# Patient Record
Sex: FEMALE | Race: BLACK OR AFRICAN AMERICAN | NOT HISPANIC OR LATINO
[De-identification: names, ages, dates, MRNs, and addresses within clinical notes are randomized per-mention and may not be internally consistent; named-entity substitution may affect disease eponyms.]

---

## 2017-09-22 ENCOUNTER — CHARTING TRANS (OUTPATIENT)
Dept: OTHER | Age: 47
End: 2017-09-22

## 2017-09-22 ENCOUNTER — MYAURORA ACCOUNT LINK (OUTPATIENT)
Dept: OTHER | Age: 47
End: 2017-09-22

## 2017-09-22 ASSESSMENT — PAIN SCALES - GENERAL: PAINLEVEL_OUTOF10: 0

## 2017-10-26 ENCOUNTER — LAB SERVICES (OUTPATIENT)
Dept: OTHER | Age: 47
End: 2017-10-26

## 2017-10-26 ENCOUNTER — MYAURORA ACCOUNT LINK (OUTPATIENT)
Dept: OTHER | Age: 47
End: 2017-10-26

## 2017-10-26 ENCOUNTER — CHARTING TRANS (OUTPATIENT)
Dept: OTHER | Age: 47
End: 2017-10-26

## 2017-10-26 ASSESSMENT — PAIN SCALES - GENERAL: PAINLEVEL_OUTOF10: 0

## 2017-11-02 ENCOUNTER — CHARTING TRANS (OUTPATIENT)
Dept: OTHER | Age: 47
End: 2017-11-02

## 2017-11-02 LAB — PAP WITH HIGH RISK HPV: NORMAL

## 2018-07-06 ENCOUNTER — MYAURORA ACCOUNT LINK (OUTPATIENT)
Dept: OTHER | Age: 48
End: 2018-07-06

## 2018-07-06 ENCOUNTER — CHARTING TRANS (OUTPATIENT)
Dept: OTHER | Age: 48
End: 2018-07-06

## 2018-07-06 ASSESSMENT — PAIN SCALES - GENERAL: PAINLEVEL_OUTOF10: 0

## 2018-10-31 VITALS
TEMPERATURE: 98.1 F | OXYGEN SATURATION: 98 % | SYSTOLIC BLOOD PRESSURE: 134 MMHG | WEIGHT: 234.35 LBS | HEIGHT: 69 IN | DIASTOLIC BLOOD PRESSURE: 88 MMHG | BODY MASS INDEX: 34.71 KG/M2 | HEART RATE: 79 BPM

## 2018-11-02 VITALS
HEART RATE: 77 BPM | RESPIRATION RATE: 18 BRPM | HEIGHT: 69 IN | BODY MASS INDEX: 34.07 KG/M2 | DIASTOLIC BLOOD PRESSURE: 78 MMHG | SYSTOLIC BLOOD PRESSURE: 130 MMHG | WEIGHT: 230 LBS

## 2018-11-03 VITALS
SYSTOLIC BLOOD PRESSURE: 130 MMHG | WEIGHT: 226 LBS | HEART RATE: 78 BPM | OXYGEN SATURATION: 97 % | DIASTOLIC BLOOD PRESSURE: 80 MMHG | HEIGHT: 69 IN | TEMPERATURE: 97.8 F | RESPIRATION RATE: 20 BRPM | BODY MASS INDEX: 33.47 KG/M2

## 2019-02-23 RX ORDER — FLUTICASONE PROPIONATE 50 MCG
SPRAY, SUSPENSION (ML) NASAL
Qty: 16 ML | Refills: 1 | Status: SHIPPED | OUTPATIENT
Start: 2019-02-23 | End: 2019-03-21 | Stop reason: SDUPTHER

## 2019-02-26 ENCOUNTER — TELEPHONE (OUTPATIENT)
Dept: SCHEDULING | Age: 49
End: 2019-02-26

## 2019-02-26 ENCOUNTER — E-ADVICE (OUTPATIENT)
Dept: OBGYN | Age: 49
End: 2019-02-26

## 2019-02-26 DIAGNOSIS — I10 HTN (HYPERTENSION), BENIGN: Primary | ICD-10-CM

## 2019-02-26 RX ORDER — AMLODIPINE BESYLATE 5 MG/1
5 TABLET ORAL DAILY
Qty: 30 TABLET | Refills: 0 | Status: SHIPPED | OUTPATIENT
Start: 2019-02-26 | End: 2019-04-25 | Stop reason: SDUPTHER

## 2019-02-26 RX ORDER — AMLODIPINE BESYLATE 5 MG/1
TABLET ORAL
COMMUNITY
Start: 2018-07-06 | End: 2019-02-26 | Stop reason: SDUPTHER

## 2019-03-04 ENCOUNTER — TELEPHONE (OUTPATIENT)
Dept: SCHEDULING | Age: 49
End: 2019-03-04

## 2019-03-21 DIAGNOSIS — Z00.00 PREVENTATIVE HEALTH CARE: Primary | ICD-10-CM

## 2019-03-21 RX ORDER — FLUTICASONE PROPIONATE 50 MCG
2 SPRAY, SUSPENSION (ML) NASAL DAILY
Qty: 16 ML | Refills: 1 | Status: SHIPPED | OUTPATIENT
Start: 2019-03-21

## 2019-04-19 ENCOUNTER — OFFICE VISIT (OUTPATIENT)
Dept: INTERNAL MEDICINE | Age: 49
End: 2019-04-19

## 2019-04-19 ENCOUNTER — LAB SERVICES (OUTPATIENT)
Dept: LAB | Age: 49
End: 2019-04-19

## 2019-04-19 DIAGNOSIS — I10 ESSENTIAL HYPERTENSION: Primary | ICD-10-CM

## 2019-04-19 DIAGNOSIS — Z12.9 CANCER SCREENING: ICD-10-CM

## 2019-04-19 DIAGNOSIS — I10 ESSENTIAL HYPERTENSION: ICD-10-CM

## 2019-04-19 DIAGNOSIS — E78.00 PURE HYPERCHOLESTEROLEMIA: ICD-10-CM

## 2019-04-19 DIAGNOSIS — L81.9 SKIN HYPOPIGMENTATION: ICD-10-CM

## 2019-04-19 LAB
ANION GAP SERPL CALC-SCNC: 11 MMOL/L (ref 10–20)
BUN SERPL-MCNC: 11 MG/DL (ref 6–20)
BUN/CREAT SERPL: 16 (ref 7–25)
CALCIUM SERPL-MCNC: 9.5 MG/DL (ref 8.4–10.2)
CHLORIDE SERPL-SCNC: 105 MMOL/L (ref 98–107)
CHOLEST SERPL-MCNC: 271 MG/DL
CHOLEST/HDLC SERPL: 3.3 {RATIO}
CO2 SERPL-SCNC: 27 MMOL/L (ref 21–32)
CREAT SERPL-MCNC: 0.71 MG/DL (ref 0.51–0.95)
FASTING STATUS PATIENT QL REPORTED: NORMAL HRS
GLUCOSE SERPL-MCNC: 76 MG/DL (ref 65–99)
HDLC SERPL-MCNC: 82 MG/DL
LDLC SERPL-MCNC: 166 MG/DL
LENGTH OF FAST TIME PATIENT: ABNORMAL HRS
NONHDLC SERPL-MCNC: 189 MG/DL
POTASSIUM SERPL-SCNC: 4.2 MMOL/L (ref 3.4–5.1)
SODIUM SERPL-SCNC: 139 MMOL/L (ref 135–145)
TRIGL SERPL-MCNC: 114 MG/DL

## 2019-04-19 PROCEDURE — 36415 COLL VENOUS BLD VENIPUNCTURE: CPT

## 2019-04-19 PROCEDURE — 3079F DIAST BP 80-89 MM HG: CPT | Performed by: INTERNAL MEDICINE

## 2019-04-19 PROCEDURE — 80048 BASIC METABOLIC PNL TOTAL CA: CPT | Performed by: INTERNAL MEDICINE

## 2019-04-19 PROCEDURE — 3074F SYST BP LT 130 MM HG: CPT | Performed by: INTERNAL MEDICINE

## 2019-04-19 PROCEDURE — 99214 OFFICE O/P EST MOD 30 MIN: CPT | Performed by: INTERNAL MEDICINE

## 2019-04-19 PROCEDURE — 80061 LIPID PANEL: CPT | Performed by: INTERNAL MEDICINE

## 2019-04-19 SDOH — HEALTH STABILITY: MENTAL HEALTH
STRESS IS WHEN SOMEONE FEELS TENSE, NERVOUS, ANXIOUS, OR CAN'T SLEEP AT NIGHT BECAUSE THEIR MIND IS TROUBLED. HOW STRESSED ARE YOU?: NOT AT ALL

## 2019-04-19 SDOH — HEALTH STABILITY: PHYSICAL HEALTH: ON AVERAGE, HOW MANY MINUTES DO YOU ENGAGE IN EXERCISE AT THIS LEVEL?: 0 MIN

## 2019-04-19 SDOH — HEALTH STABILITY: PHYSICAL HEALTH: ON AVERAGE, HOW MANY DAYS PER WEEK DO YOU ENGAGE IN MODERATE TO STRENUOUS EXERCISE (LIKE A BRISK WALK)?: 0 DAYS

## 2019-04-19 ASSESSMENT — PATIENT HEALTH QUESTIONNAIRE - PHQ9
2. FEELING DOWN, DEPRESSED OR HOPELESS: NOT AT ALL
1. LITTLE INTEREST OR PLEASURE IN DOING THINGS: NOT AT ALL
1. LITTLE INTEREST OR PLEASURE IN DOING THINGS: NOT AT ALL
SUM OF ALL RESPONSES TO PHQ9 QUESTIONS 1 AND 2: 0
2. FEELING DOWN, DEPRESSED OR HOPELESS: NOT AT ALL

## 2019-04-19 ASSESSMENT — PAIN SCALES - GENERAL: PAINLEVEL: 0

## 2019-04-23 ENCOUNTER — HOSPITAL (OUTPATIENT)
Dept: OTHER | Age: 49
End: 2019-04-23

## 2019-04-23 ENCOUNTER — IMAGING SERVICES (OUTPATIENT)
Dept: MAMMOGRAPHY | Age: 49
End: 2019-04-23

## 2019-04-23 DIAGNOSIS — Z12.9 CANCER SCREENING: ICD-10-CM

## 2019-04-23 PROCEDURE — 77067 SCR MAMMO BI INCL CAD: CPT | Performed by: EMERGENCY MEDICINE

## 2019-04-23 PROCEDURE — 77063 BREAST TOMOSYNTHESIS BI: CPT | Performed by: EMERGENCY MEDICINE

## 2019-04-25 ENCOUNTER — E-ADVICE (OUTPATIENT)
Dept: INTERNAL MEDICINE | Age: 49
End: 2019-04-25

## 2019-04-25 DIAGNOSIS — I10 HTN (HYPERTENSION), BENIGN: ICD-10-CM

## 2019-04-25 RX ORDER — AMLODIPINE BESYLATE 10 MG/1
10 TABLET ORAL DAILY
Qty: 90 TABLET | Refills: 1 | Status: SHIPPED | OUTPATIENT
Start: 2019-04-25 | End: 2019-05-02 | Stop reason: SDUPTHER

## 2019-04-26 ENCOUNTER — TELEPHONE (OUTPATIENT)
Dept: SCHEDULING | Age: 49
End: 2019-04-26

## 2019-04-30 ENCOUNTER — E-ADVICE (OUTPATIENT)
Dept: INTERNAL MEDICINE | Age: 49
End: 2019-04-30

## 2019-05-02 DIAGNOSIS — I10 HTN (HYPERTENSION), BENIGN: ICD-10-CM

## 2019-05-02 RX ORDER — AMLODIPINE BESYLATE 5 MG/1
5 TABLET ORAL DAILY
Qty: 90 TABLET | Refills: 3 | Status: SHIPPED | OUTPATIENT
Start: 2019-05-02 | End: 2020-05-01

## 2019-05-13 ENCOUNTER — E-ADVICE (OUTPATIENT)
Dept: OBGYN | Age: 49
End: 2019-05-13

## 2019-05-17 ENCOUNTER — APPOINTMENT (OUTPATIENT)
Dept: INTERNAL MEDICINE | Age: 49
End: 2019-05-17

## 2020-01-17 ENCOUNTER — E-ADVICE (OUTPATIENT)
Dept: OBGYN | Age: 50
End: 2020-01-17

## 2020-01-17 ENCOUNTER — E-ADVICE (OUTPATIENT)
Dept: INTERNAL MEDICINE | Age: 50
End: 2020-01-17

## 2020-04-08 ENCOUNTER — E-ADVICE (OUTPATIENT)
Dept: INTERNAL MEDICINE | Age: 50
End: 2020-04-08

## 2020-05-27 ENCOUNTER — E-ADVICE (OUTPATIENT)
Dept: INTERNAL MEDICINE | Age: 50
End: 2020-05-27

## 2020-05-29 RX ORDER — AMLODIPINE BESYLATE 5 MG/1
TABLET ORAL
Qty: 30 TABLET | Refills: 11 | OUTPATIENT
Start: 2020-05-29

## 2020-06-02 ENCOUNTER — TELEPHONE (OUTPATIENT)
Dept: SCHEDULING | Age: 50
End: 2020-06-02

## 2020-06-03 ENCOUNTER — APPOINTMENT (OUTPATIENT)
Dept: INTERNAL MEDICINE | Age: 50
End: 2020-06-03

## 2020-06-03 ENCOUNTER — TELEPHONE (OUTPATIENT)
Dept: INTERNAL MEDICINE | Age: 50
End: 2020-06-03

## 2020-06-03 ENCOUNTER — V-VISIT (OUTPATIENT)
Dept: INTERNAL MEDICINE | Age: 50
End: 2020-06-03

## 2020-06-03 DIAGNOSIS — E78.00 PURE HYPERCHOLESTEROLEMIA: ICD-10-CM

## 2020-06-03 DIAGNOSIS — Z71.89 ADVICE GIVEN ABOUT 2019 NOVEL CORONAVIRUS BY TELEPHONE: ICD-10-CM

## 2020-06-03 DIAGNOSIS — I10 ESSENTIAL HYPERTENSION: Primary | ICD-10-CM

## 2020-06-03 PROCEDURE — 99213 OFFICE O/P EST LOW 20 MIN: CPT | Performed by: INTERNAL MEDICINE

## 2020-06-03 RX ORDER — AMLODIPINE BESYLATE 5 MG/1
5 TABLET ORAL DAILY
Qty: 90 TABLET | Refills: 0 | Status: SHIPPED | OUTPATIENT
Start: 2020-06-03 | End: 2020-08-27

## 2020-06-03 ASSESSMENT — PATIENT HEALTH QUESTIONNAIRE - PHQ9
SUM OF ALL RESPONSES TO PHQ9 QUESTIONS 1 AND 2: 0
CLINICAL INTERPRETATION OF PHQ2 SCORE: NO FURTHER SCREENING NEEDED
CLINICAL INTERPRETATION OF PHQ9 SCORE: NO FURTHER SCREENING NEEDED
2. FEELING DOWN, DEPRESSED OR HOPELESS: NOT AT ALL
SUM OF ALL RESPONSES TO PHQ9 QUESTIONS 1 AND 2: 0
1. LITTLE INTEREST OR PLEASURE IN DOING THINGS: NOT AT ALL

## 2020-06-03 ASSESSMENT — PAIN SCALES - GENERAL: PAINLEVEL: 0

## 2020-08-24 DIAGNOSIS — Z00.00 ROUTINE GENERAL MEDICAL EXAMINATION AT A HEALTH CARE FACILITY: Primary | ICD-10-CM

## 2020-08-27 DIAGNOSIS — I10 ESSENTIAL HYPERTENSION: ICD-10-CM

## 2020-08-27 RX ORDER — AMLODIPINE BESYLATE 5 MG/1
TABLET ORAL
Qty: 30 TABLET | Refills: 2 | Status: SHIPPED | OUTPATIENT
Start: 2020-08-27

## 2020-09-18 ENCOUNTER — HOSPITAL ENCOUNTER (OUTPATIENT)
Dept: RADIOLOGY | Facility: HOSPITAL | Age: 50
Discharge: HOME | End: 2020-09-18
Attending: INTERNAL MEDICINE
Payer: COMMERCIAL

## 2020-09-18 ENCOUNTER — CLINICAL SUPPORT (OUTPATIENT)
Dept: INTERNAL MEDICINE | Facility: CLINIC | Age: 50
End: 2020-09-18
Payer: COMMERCIAL

## 2020-09-18 ENCOUNTER — OFFICE VISIT (OUTPATIENT)
Dept: INTERNAL MEDICINE | Facility: CLINIC | Age: 50
End: 2020-09-18
Payer: COMMERCIAL

## 2020-09-18 DIAGNOSIS — Z00.00 ANNUAL PHYSICAL EXAM: Primary | ICD-10-CM

## 2020-09-18 DIAGNOSIS — Z23 NEED FOR SHINGLES VACCINE: ICD-10-CM

## 2020-09-18 DIAGNOSIS — Z12.11 COLON CANCER SCREENING: ICD-10-CM

## 2020-09-18 DIAGNOSIS — I10 ESSENTIAL HYPERTENSION: ICD-10-CM

## 2020-09-18 DIAGNOSIS — Z00.00 ROUTINE GENERAL MEDICAL EXAMINATION AT A HEALTH CARE FACILITY: ICD-10-CM

## 2020-09-18 DIAGNOSIS — E78.5 DYSLIPIDEMIA, GOAL LDL BELOW 130: ICD-10-CM

## 2020-09-18 LAB
ALBUMIN SERPL BCP-MCNC: 4 G/DL (ref 3.5–5.2)
ALP SERPL-CCNC: 45 U/L (ref 55–135)
ALT SERPL W/O P-5'-P-CCNC: 11 U/L (ref 10–44)
ANION GAP SERPL CALC-SCNC: 7 MMOL/L (ref 8–16)
AST SERPL-CCNC: 16 U/L (ref 10–40)
BILIRUB SERPL-MCNC: 0.6 MG/DL (ref 0.1–1)
BUN SERPL-MCNC: 12 MG/DL (ref 6–20)
CALCIUM SERPL-MCNC: 9.5 MG/DL (ref 8.7–10.5)
CHLORIDE SERPL-SCNC: 103 MMOL/L (ref 95–110)
CHOLEST SERPL-MCNC: 282 MG/DL (ref 120–199)
CHOLEST/HDLC SERPL: 4.3 {RATIO} (ref 2–5)
CO2 SERPL-SCNC: 28 MMOL/L (ref 23–29)
CREAT SERPL-MCNC: 0.8 MG/DL (ref 0.5–1.4)
ERYTHROCYTE [DISTWIDTH] IN BLOOD BY AUTOMATED COUNT: 14.2 % (ref 11.5–14.5)
EST. GFR  (AFRICAN AMERICAN): >60 ML/MIN/1.73 M^2
EST. GFR  (NON AFRICAN AMERICAN): >60 ML/MIN/1.73 M^2
GLUCOSE SERPL-MCNC: 99 MG/DL (ref 70–110)
HCT VFR BLD AUTO: 43.4 % (ref 37–48.5)
HDLC SERPL-MCNC: 66 MG/DL (ref 40–75)
HDLC SERPL: 23.4 % (ref 20–50)
HGB BLD-MCNC: 14 G/DL (ref 12–16)
HIV 1+2 AB+HIV1 P24 AG SERPL QL IA: NEGATIVE
LDLC SERPL CALC-MCNC: 188.2 MG/DL (ref 63–159)
MCH RBC QN AUTO: 28.7 PG (ref 27–31)
MCHC RBC AUTO-ENTMCNC: 32.3 G/DL (ref 32–36)
MCV RBC AUTO: 89 FL (ref 82–98)
NONHDLC SERPL-MCNC: 216 MG/DL
PLATELET # BLD AUTO: 276 K/UL (ref 150–350)
PMV BLD AUTO: 10.8 FL (ref 9.2–12.9)
POTASSIUM SERPL-SCNC: 4 MMOL/L (ref 3.5–5.1)
PROT SERPL-MCNC: 7.9 G/DL (ref 6–8.4)
RBC # BLD AUTO: 4.88 M/UL (ref 4–5.4)
SARS-COV-2 IGG SERPLBLD QL IA.RAPID: NEGATIVE
SODIUM SERPL-SCNC: 138 MMOL/L (ref 136–145)
TRIGL SERPL-MCNC: 139 MG/DL (ref 30–150)
WBC # BLD AUTO: 6.51 K/UL (ref 3.9–12.7)

## 2020-09-18 PROCEDURE — 77067 MAMMO DIGITAL SCREENING BILAT WITH TOMOSYNTHESIS_CAD: ICD-10-PCS | Mod: 26,,, | Performed by: RADIOLOGY

## 2020-09-18 PROCEDURE — 80061 LIPID PANEL: CPT

## 2020-09-18 PROCEDURE — 99999 PR PBB SHADOW E&M-EST. PATIENT-LVL III: CPT | Mod: PBBFAC,,, | Performed by: INTERNAL MEDICINE

## 2020-09-18 PROCEDURE — 77067 SCR MAMMO BI INCL CAD: CPT | Mod: 26,,, | Performed by: RADIOLOGY

## 2020-09-18 PROCEDURE — 77063 MAMMO DIGITAL SCREENING BILAT WITH TOMOSYNTHESIS_CAD: ICD-10-PCS | Mod: 26,,, | Performed by: RADIOLOGY

## 2020-09-18 PROCEDURE — 99386 PR PREVENTIVE VISIT,NEW,40-64: ICD-10-PCS | Mod: S$GLB,,, | Performed by: INTERNAL MEDICINE

## 2020-09-18 PROCEDURE — 77063 BREAST TOMOSYNTHESIS BI: CPT | Mod: 26,,, | Performed by: RADIOLOGY

## 2020-09-18 PROCEDURE — 77067 SCR MAMMO BI INCL CAD: CPT | Mod: TC

## 2020-09-18 PROCEDURE — 99999 PR PBB SHADOW E&M-EST. PATIENT-LVL III: ICD-10-PCS | Mod: PBBFAC,,, | Performed by: INTERNAL MEDICINE

## 2020-09-18 PROCEDURE — 86769 SARS-COV-2 COVID-19 ANTIBODY: CPT

## 2020-09-18 PROCEDURE — 36415 COLL VENOUS BLD VENIPUNCTURE: CPT

## 2020-09-18 PROCEDURE — 99386 PREV VISIT NEW AGE 40-64: CPT | Mod: S$GLB,,, | Performed by: INTERNAL MEDICINE

## 2020-09-18 PROCEDURE — 80053 COMPREHEN METABOLIC PANEL: CPT

## 2020-09-18 PROCEDURE — 85027 COMPLETE CBC AUTOMATED: CPT

## 2020-09-18 PROCEDURE — 86703 HIV-1/HIV-2 1 RESULT ANTBDY: CPT

## 2020-09-18 RX ORDER — ZOSTER VACCINE RECOMBINANT, ADJUVANTED 50 MCG/0.5
0.5 KIT INTRAMUSCULAR ONCE
Qty: 1 EACH | Refills: 1 | Status: SHIPPED | OUTPATIENT
Start: 2020-09-18 | End: 2020-09-18

## 2020-09-18 RX ORDER — AMLODIPINE BESYLATE 5 MG/1
TABLET ORAL
COMMUNITY
Start: 2020-08-27 | End: 2020-09-18 | Stop reason: SDUPTHER

## 2020-09-18 RX ORDER — AMLODIPINE BESYLATE 5 MG/1
5 TABLET ORAL DAILY
Qty: 90 TABLET | Refills: 3 | Status: SHIPPED | OUTPATIENT
Start: 2020-09-18 | End: 2021-01-29 | Stop reason: SDUPTHER

## 2020-09-18 NOTE — LETTER
September 22, 2020    Jack GRAZYNA Shanique  [address]  [address]  [facility] [address]             [facility]  [facility], [address] [place] [id number]  Phone: [phone]  Fax: [phone] Dear Dr. Jack Bay:    9/18/2020    Jack GERONIMO Shanique  [address]  [address]  [facility] [address]       [facility]  [address] [place] [address], [address] [place] [id number]  Phone: [phone]  Fax: [phone] Dear Jack Bay:    Thank you for allowing me to serve you and perform your Executive Health exam on 9/18/2020.  This letter will serve a brief summary of the history, findings and discussions at that time. I have included the lab and study results for you to have available at future healthcare appointments.      Reason for Visit: Executive Health Preventive Physical Examination    Past Medical History:  Past Medical History:   Diagnosis Date    Hypertension       Medications:    Current Outpatient Medications:     amLODIPine (NORVASC) 5 MG tablet, Take 1 tablet (5 mg total) by mouth once daily., Disp: 90 tablet, Rfl: 3      Physical Exam:   No abnormalities appreciated on physical exam      Labs:  Recent Results (from the past 168 hour(s))   Comprehensive metabolic panel    Collection Time: 09/18/20 11:11 AM   Result Value Ref Range    Sodium 138 136 - 145 mmol/L    Potassium 4.0 3.5 - 5.1 mmol/L    Chloride 103 95 - 110 mmol/L    CO2 28 23 - 29 mmol/L    Glucose 99 70 - 110 mg/dL    BUN, Bld 12 6 - 20 mg/dL    Creatinine 0.8 0.5 - 1.4 mg/dL    Calcium 9.5 8.7 - 10.5 mg/dL    Total Protein 7.9 6.0 - 8.4 g/dL    Albumin 4.0 3.5 - 5.2 g/dL    Total Bilirubin 0.6 0.1 - 1.0 mg/dL    Alkaline Phosphatase 45 (L) 55 - 135 U/L    AST 16 10 - 40 U/L    ALT 11 10 - 44 U/L    Anion Gap 7 (L) 8 - 16 mmol/L    eGFR if African American >60.0 >60 mL/min/1.73 m^2    eGFR if non African American >60.0 >60 mL/min/1.73  m^2   CBC Without Differential    Collection Time: 09/18/20 11:11 AM   Result Value Ref Range    WBC 6.51 3.90 - 12.70 K/uL    RBC 4.88 4.00 - 5.40 M/uL    Hemoglobin 14.0 12.0 - 16.0 g/dL    Hematocrit 43.4 37.0 - 48.5 %    Mean Corpuscular Volume 89 82 - 98 fL    Mean Corpuscular Hemoglobin 28.7 27.0 - 31.0 pg    Mean Corpuscular Hemoglobin Conc 32.3 32.0 - 36.0 g/dL    RDW 14.2 11.5 - 14.5 %    Platelets 276 150 - 350 K/uL    MPV 10.8 9.2 - 12.9 fL   Lipid panel    Collection Time: 09/18/20 11:11 AM   Result Value Ref Range    Cholesterol 282 (H) 120 - 199 mg/dL    Triglycerides 139 30 - 150 mg/dL    HDL 66 40 - 75 mg/dL    LDL Cholesterol 188.2 (H) 63.0 - 159.0 mg/dL    Hdl/Cholesterol Ratio 23.4 20.0 - 50.0 %    Total Cholesterol/HDL Ratio 4.3 2.0 - 5.0    Non-HDL Cholesterol 216 mg/dL   HIV 1/2 Ag/Ab (4th Gen)    Collection Time: 09/18/20 11:11 AM   Result Value Ref Range    HIV 1/2 Ag/Ab Negative Negative   COVID-19 (SARS CoV-2) IgG Antibody    Collection Time: 09/18/20 11:11 AM   Result Value Ref Range    Antibody SARS CoV-2 Negative Negative         Assessment/Recommendations:    Annual physical exam     Health Maintenance   Topic Date Due    Hepatitis C Screening  1970    Mammogram  12/23/2008-     Lipid Panel  09/18/2025    TETANUS VACCINE  07/04/2028     Health Maintenance Reviewed   We will get a attempt to get a copy of your past mammograms and Pap smears from [place] with faxed signed consent    Essential hypertension   Continue with  amLODIPine (NORVASC) 5 MG tablet; Take 1 tablet (5 mg total) by mouth once daily.  Dispense: 90 tablet; Refill: 3   Would recommend periodic checking of blood pressure, possible 3 times per week at different times of the day and in different circumstances.    Dyslipidemia       Your results were not back when he left the clinic but your LDL or bad cholesterol is 188 and your total cholesterol is  282 which is quite high. Anthonya, your cardiovascular risk factors  being  hypertension and over 50 yrs of age, I do recommend aggressive lipid lowering either by diet and exercise with a trial of 4-6 months and we can recheck lipid profile or starting on a moderate dose statin.  Please call the office at [phone] to discuss.    Need for shingles vaccine   I do recommend the 2 series booster of the Shingrix vaccine which is very effective in providing the shingles and post herpetic neuralgia in the future  -     varicella-zoster gE-AS01B, PF, (SHINGRIX, PF,) 50 mcg/0.5 mL injection; Inject 0.5 mLs into the muscle once. for 1 dose  Dispense: 1 each; Refill: 1    Colon cancer screening  -     Cologuard Screening (Multitarget Stool DNA); Future; Expected date: 09/18/2020        It was a pleasure meeting you for your health exam.    If you have any questions or concerns, please don't hesitate to call.    Sincerely,    Veronica Pardo MD

## 2020-09-21 VITALS
HEART RATE: 80 BPM | BODY MASS INDEX: 37.18 KG/M2 | HEIGHT: 69 IN | TEMPERATURE: 98 F | SYSTOLIC BLOOD PRESSURE: 135 MMHG | DIASTOLIC BLOOD PRESSURE: 70 MMHG | WEIGHT: 251 LBS

## 2020-09-21 NOTE — PROGRESS NOTES
Subjective:       Patient ID: Jack Bay is a 49 y.o. female.    Chief Complaint: Annual Exam    49-year-old female nonsmoker here for an executive health physical exam.  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  She has no new complaints today and her only past medical history is of essential hypertension  She had elevation in her blood pressure initially in the office visit but on recheck it was within normal limits.  She does not monitor her blood pressure at home however but is willing to start.   Lab work done today consisted of a CMP, CBC, lipid profile, HIV and a COVID serology antibody none of which was back upon her office visit.  She is also having mammogram and these results are pending as well.  She did not do an EKG.    Review of Systems   Constitutional: Negative for activity change, appetite change, chills, diaphoresis, fatigue, fever and unexpected weight change.   HENT: Negative for nasal congestion, ear pain, mouth sores, postnasal drip, sinus pressure/congestion, sore throat and trouble swallowing.    Eyes: Negative for pain, redness and visual disturbance.   Respiratory: Negative for apnea, cough, chest tightness, shortness of breath and wheezing.    Cardiovascular: Negative for chest pain, palpitations and leg swelling.   Gastrointestinal: Negative for abdominal distention, abdominal pain, blood in stool, constipation, diarrhea, nausea and vomiting.   Endocrine: Negative for cold intolerance, polydipsia, polyphagia and polyuria.   Genitourinary: Negative for difficulty urinating, dysuria, flank pain, frequency, hematuria, menstrual problem, pelvic pain and urgency.   Musculoskeletal: Negative for arthralgias, back pain, joint swelling and neck pain.   Integumentary:  Negative for color change, rash and wound.   Neurological: Negative for dizziness, tremors, seizures, syncope, weakness, light-headedness, numbness and headaches.   Hematological:  Negative for adenopathy. Does not bruise/bleed easily.   Psychiatric/Behavioral: Negative for confusion, decreased concentration, dysphoric mood, hallucinations, self-injury, sleep disturbance and suicidal ideas. The patient is not nervous/anxious.          Objective:      Physical Exam  Constitutional:       General: She is not in acute distress.     Appearance: She is well-developed. She is not diaphoretic.   HENT:      Head: Normocephalic and atraumatic.      Right Ear: Tympanic membrane, ear canal and external ear normal. No drainage. Tympanic membrane is not scarred or retracted.      Left Ear: Tympanic membrane, ear canal and external ear normal. No drainage. Tympanic membrane is not scarred or retracted.      Nose: Mucosal edema present.      Right Sinus: No maxillary sinus tenderness or frontal sinus tenderness.      Left Sinus: No maxillary sinus tenderness or frontal sinus tenderness.      Mouth/Throat:      Pharynx: Posterior oropharyngeal erythema present. No oropharyngeal exudate or uvula swelling.      Tonsils: No tonsillar exudate.   Eyes:      General:         Right eye: No discharge.         Left eye: No discharge.      Conjunctiva/sclera: Conjunctivae normal.   Neck:      Musculoskeletal: Normal range of motion and neck supple.   Cardiovascular:      Rate and Rhythm: Normal rate and regular rhythm.   Pulmonary:      Effort: Pulmonary effort is normal. No respiratory distress.      Breath sounds: Normal breath sounds. No stridor. No wheezing or rales.   Chest:      Chest wall: No tenderness.   Lymphadenopathy:      Head:      Right side of head: No submandibular, preauricular or posterior auricular adenopathy.      Left side of head: No submandibular, preauricular or posterior auricular adenopathy.      Cervical: No cervical adenopathy.   Skin:     General: Skin is warm and dry.   Psychiatric:         Behavior: Behavior normal.         Lab Results   Component Value Date    WBC 6.51 09/18/2020    HGB  14.0 09/18/2020    HCT 43.4 09/18/2020     09/18/2020    CHOL 282 (H) 09/18/2020    TRIG 139 09/18/2020    HDL 66 09/18/2020    ALT 11 09/18/2020    AST 16 09/18/2020     09/18/2020    K 4.0 09/18/2020     09/18/2020    CREATININE 0.8 09/18/2020    BUN 12 09/18/2020    CO2 28 09/18/2020     Assessment:       1. Annual physical exam    2. Essential hypertension    3. Dyslipidemia, goal LDL below 130    4. Need for shingles vaccine    5. Colon cancer screening        Plan:       Annual physical exam     Health Maintenance   Topic Date Due    Hepatitis C Screening  1970    Mammogram  12/23/2008-     Lipid Panel  09/18/2025    TETANUS VACCINE  07/04/2028     Health Maintenance Reviewed   We will get a attempt to get a copy of your past mammograms and Pap smears from [facility] with faxed signed consent    Essential hypertension   Continue with  amLODIPine (NORVASC) 5 MG tablet; Take 1 tablet (5 mg total) by mouth once daily.  Dispense: 90 tablet; Refill: 3   Would recommend periodic checking of blood pressure, possible 3 times per week at different times of the day and in different circumstances.    Dyslipidemia       Your results were not back when he left the clinic but your LDL or bad cholesterol is 188 and your total cholesterol is  282 which is quite high. Femiuana, your cardiovascular risk factors being  hypertension and over 50 yrs of age, I do recommend aggressive lipid lowering either by diet and exercise with a trial of 4-6 months and we can recheck lipid profile or starting on a moderate dose statin.  Please call the office at [phone] to discuss.    Need for shingles vaccine   I do recommend the 2 series booster of the Shingrix vaccine which is very effective in providing the shingles and post herpetic neuralgia in the future  -     varicella-zoster gE-AS01B, PF, (SHINGRIX, PF,) 50 mcg/0.5 mL injection; Inject 0.5 mLs into the muscle once. for 1 dose  Dispense: 1 each; Refill:  1    Colon cancer screening  -     Cologuard Screening (Multitarget Stool DNA); Future; Expected date: 09/18/2020

## 2020-09-25 ENCOUNTER — TELEPHONE (OUTPATIENT)
Dept: SCHEDULING | Age: 50
End: 2020-09-25

## 2020-09-25 ENCOUNTER — TELEPHONE (OUTPATIENT)
Dept: INTERNAL MEDICINE | Age: 50
End: 2020-09-25

## 2020-09-25 ENCOUNTER — OFFICE VISIT (OUTPATIENT)
Dept: INTERNAL MEDICINE | Facility: CLINIC | Age: 50
End: 2020-09-25
Payer: COMMERCIAL

## 2020-09-25 DIAGNOSIS — I10 ESSENTIAL HYPERTENSION: Primary | ICD-10-CM

## 2020-09-25 DIAGNOSIS — E78.5 DYSLIPIDEMIA, GOAL LDL BELOW 100: ICD-10-CM

## 2020-09-25 PROCEDURE — 99213 PR OFFICE/OUTPT VISIT, EST, LEVL III, 20-29 MIN: ICD-10-PCS | Mod: 95,,, | Performed by: INTERNAL MEDICINE

## 2020-09-25 PROCEDURE — 99213 OFFICE O/P EST LOW 20 MIN: CPT | Mod: 95,,, | Performed by: INTERNAL MEDICINE

## 2020-09-25 RX ORDER — ATORVASTATIN CALCIUM 20 MG/1
20 TABLET, FILM COATED ORAL DAILY
Qty: 90 TABLET | Refills: 3 | Status: SHIPPED | OUTPATIENT
Start: 2020-09-25 | End: 2021-01-29 | Stop reason: SDUPTHER

## 2020-09-25 NOTE — Clinical Note
CAN YOU BOOK THE CMP AND LIPID I ORDERED FOR THE SECOND FLOOR FOR HER, ON 12/11? SHE HAS AN OB/GYN APPT HERE AT 3 PM SO I AM THINKING AROUND 12 AS SHE WILL DO A LIGHT FAST  PLUS CAN YOU PRETTY PLEASE CALL AND REFAX CONSENT SO I CAN GET THE RECENT LABS AND THE DR'S PROGRESS NOTE, DR THOMPSON

## 2020-09-28 ENCOUNTER — PATIENT MESSAGE (OUTPATIENT)
Dept: INTERNAL MEDICINE | Facility: CLINIC | Age: 50
End: 2020-09-28

## 2020-09-28 NOTE — PROGRESS NOTES
Subjective:       Patient ID: Jack Bay is a 49 y.o. female.    Chief Complaint: No chief complaint on file.      49-year-old female who I saw for [facility] last week but in whom her lab work had not returned.  She has a history of well-controlled hypertension on amlodipine.  Her total cholesterol was 282 with a good cholesterol 66 and LDL of 188.  We calculated her [place] cardiovascular risk in 10 years and was greater than 7.5% at 8.54%.  She is amenable to moderate dose statin.  She was also given the phone number for the gastroenterologist refer to for a screening colonoscopy.  The patient has an appointment for her Pap smear as well and a few weeks.    The patient location is: in [place] at her home  The chief complaint leading to consultation is: lab review    Visit type: audiovisual    Face to Face time with patient: 25  15 minutes of total time spent on the encounter, which includes face to face time and non-face to face time preparing to see the patient (eg, review of tests), Obtaining and/or reviewing separately obtained history, Documenting clinical information in the electronic or other health record, Independently interpreting results (not separately reported) and communicating results to the patient/family/caregiver, or Care coordination (not separately reported).         Each patient to whom he or she provides medical services by telemedicine is:  (1) informed of the relationship between the physician and patient and the respective role of any other health care provider with respect to management of the patient; and (2) notified that he or she may decline to receive medical services by telemedicine and may withdraw from such care at any time.        Review of Systems   Constitutional: Positive for activity change. Negative for unexpected weight change.   HENT: Negative for hearing loss, rhinorrhea and trouble swallowing.    Eyes: Negative for discharge and visual disturbance.    Respiratory: Negative for chest tightness and wheezing.    Cardiovascular: Negative for chest pain and palpitations.   Gastrointestinal: Negative for blood in stool, constipation, diarrhea and vomiting.   Endocrine: Negative for polydipsia and polyuria.   Genitourinary: Negative for difficulty urinating, dysuria, hematuria and menstrual problem.   Musculoskeletal: Negative for arthralgias, joint swelling and neck pain.   Neurological: Negative for weakness and headaches.   Psychiatric/Behavioral: Negative for confusion and dysphoric mood.         Objective:      Physical Exam  Constitutional:       General: She is not in acute distress.     Appearance: Normal appearance. She is not ill-appearing.   HENT:      Head: Normocephalic and atraumatic.   Neurological:      Mental Status: She is oriented to person, place, and time.   Psychiatric:         Mood and Affect: Mood normal.         Behavior: Behavior normal.         Thought Content: Thought content normal.         Judgment: Judgment normal.         Assessment:       1. Essential hypertension    2. Dyslipidemia, goal LDL below 100        Plan:       Essential hypertension   Continue amlodipine at 5 mg and patient will continue to periodically monitor blood pressure.    Dyslipidemia, goal LDL below 100  -     atorvastatin (LIPITOR) 20 MG tablet; Take 1 tablet (20 mg total) by mouth once daily.  Dispense: 90 tablet; Refill: 3  -     Lipid Panel; Future; Expected date: 12/11/2020  -     Comprehensive metabolic panel; Future; Expected date: 12/11/2020

## 2020-09-30 ENCOUNTER — PATIENT MESSAGE (OUTPATIENT)
Dept: INTERNAL MEDICINE | Facility: CLINIC | Age: 50
End: 2020-09-30

## 2021-01-29 ENCOUNTER — PATIENT MESSAGE (OUTPATIENT)
Dept: INTERNAL MEDICINE | Facility: CLINIC | Age: 51
End: 2021-01-29

## 2021-01-29 DIAGNOSIS — E78.5 DYSLIPIDEMIA, GOAL LDL BELOW 100: ICD-10-CM

## 2021-01-29 RX ORDER — AMLODIPINE BESYLATE 5 MG/1
5 TABLET ORAL DAILY
Qty: 90 TABLET | Refills: 3 | Status: SHIPPED | OUTPATIENT
Start: 2021-01-29 | End: 2021-02-03 | Stop reason: SDUPTHER

## 2021-01-29 RX ORDER — ATORVASTATIN CALCIUM 20 MG/1
20 TABLET, FILM COATED ORAL DAILY
Qty: 90 TABLET | Refills: 3 | Status: SHIPPED | OUTPATIENT
Start: 2021-01-29 | End: 2021-02-03 | Stop reason: SDUPTHER

## 2021-02-03 DIAGNOSIS — E78.5 DYSLIPIDEMIA, GOAL LDL BELOW 100: ICD-10-CM

## 2021-02-03 DIAGNOSIS — I10 ESSENTIAL HYPERTENSION: Primary | ICD-10-CM

## 2021-02-03 RX ORDER — AMLODIPINE BESYLATE 5 MG/1
5 TABLET ORAL DAILY
Qty: 90 TABLET | Refills: 3 | Status: SHIPPED | OUTPATIENT
Start: 2021-02-03 | End: 2021-05-01 | Stop reason: SDUPTHER

## 2021-02-03 RX ORDER — ATORVASTATIN CALCIUM 20 MG/1
20 TABLET, FILM COATED ORAL DAILY
Qty: 90 TABLET | Refills: 3 | Status: SHIPPED | OUTPATIENT
Start: 2021-02-03 | End: 2021-05-01 | Stop reason: SDUPTHER

## 2021-03-14 ENCOUNTER — TELEPHONE (OUTPATIENT)
Dept: INTERNAL MEDICINE | Facility: CLINIC | Age: 51
End: 2021-03-14

## 2021-03-14 ENCOUNTER — PATIENT MESSAGE (OUTPATIENT)
Dept: INTERNAL MEDICINE | Facility: CLINIC | Age: 51
End: 2021-03-14

## 2021-03-14 DIAGNOSIS — M70.20 OLECRANON BURSITIS, UNSPECIFIED LATERALITY: Primary | ICD-10-CM

## 2021-03-17 ENCOUNTER — PATIENT OUTREACH (OUTPATIENT)
Dept: ADMINISTRATIVE | Facility: OTHER | Age: 51
End: 2021-03-17

## 2021-03-17 DIAGNOSIS — R52 PAIN: Primary | ICD-10-CM

## 2021-03-18 ENCOUNTER — HOSPITAL ENCOUNTER (OUTPATIENT)
Dept: RADIOLOGY | Facility: OTHER | Age: 51
Discharge: HOME | End: 2021-03-18
Attending: PHYSICIAN ASSISTANT
Payer: COMMERCIAL

## 2021-03-18 ENCOUNTER — OFFICE VISIT (OUTPATIENT)
Dept: ORTHOPEDICS | Facility: CLINIC | Age: 51
End: 2021-03-18
Payer: COMMERCIAL

## 2021-03-18 VITALS
DIASTOLIC BLOOD PRESSURE: 92 MMHG | WEIGHT: 251 LBS | HEART RATE: 87 BPM | SYSTOLIC BLOOD PRESSURE: 142 MMHG | HEIGHT: 69 IN | BODY MASS INDEX: 37.18 KG/M2

## 2021-03-18 DIAGNOSIS — M70.21 OLECRANON BURSITIS OF RIGHT ELBOW: ICD-10-CM

## 2021-03-18 DIAGNOSIS — M70.20 OLECRANON BURSITIS, UNSPECIFIED LATERALITY: ICD-10-CM

## 2021-03-18 DIAGNOSIS — R52 PAIN: ICD-10-CM

## 2021-03-18 PROCEDURE — 99999 PR PBB SHADOW E&M-EST. PATIENT-LVL III: CPT | Mod: PBBFAC,,, | Performed by: PHYSICIAN ASSISTANT

## 2021-03-18 PROCEDURE — 73080 X-RAY EXAM OF ELBOW: CPT | Mod: TC,FY,RT

## 2021-03-18 PROCEDURE — 99203 PR OFFICE/OUTPT VISIT, NEW, LEVL III, 30-44 MIN: ICD-10-PCS | Mod: 25,S$GLB,, | Performed by: PHYSICIAN ASSISTANT

## 2021-03-18 PROCEDURE — 73080 X-RAY EXAM OF ELBOW: CPT | Mod: 26,RT,, | Performed by: RADIOLOGY

## 2021-03-18 PROCEDURE — 99203 OFFICE O/P NEW LOW 30 MIN: CPT | Mod: 25,S$GLB,, | Performed by: PHYSICIAN ASSISTANT

## 2021-03-18 PROCEDURE — 20605 DRAIN/INJ JOINT/BURSA W/O US: CPT | Mod: RT,S$GLB,, | Performed by: PHYSICIAN ASSISTANT

## 2021-03-18 PROCEDURE — 73080 XR ELBOW COMPLETE 3 VIEW RIGHT: ICD-10-PCS | Mod: 26,RT,, | Performed by: RADIOLOGY

## 2021-03-18 PROCEDURE — 20605 PR DRAIN/INJECT INTERMEDIATE JOINT/BURSA: ICD-10-PCS | Mod: RT,S$GLB,, | Performed by: PHYSICIAN ASSISTANT

## 2021-03-18 PROCEDURE — 99999 PR PBB SHADOW E&M-EST. PATIENT-LVL III: ICD-10-PCS | Mod: PBBFAC,,, | Performed by: PHYSICIAN ASSISTANT

## 2021-03-18 RX ORDER — LIDOCAINE HYDROCHLORIDE 10 MG/ML
2 INJECTION, SOLUTION EPIDURAL; INFILTRATION; INTRACAUDAL; PERINEURAL
Status: COMPLETED | OUTPATIENT
Start: 2021-03-18 | End: 2021-03-18

## 2021-03-18 RX ADMIN — LIDOCAINE HYDROCHLORIDE 20 MG: 10 INJECTION, SOLUTION EPIDURAL; INFILTRATION; INTRACAUDAL; PERINEURAL at 04:03

## 2021-05-26 VITALS
HEART RATE: 86 BPM | HEIGHT: 69 IN | WEIGHT: 233.69 LBS | DIASTOLIC BLOOD PRESSURE: 86 MMHG | TEMPERATURE: 96.8 F | HEART RATE: 68 BPM | SYSTOLIC BLOOD PRESSURE: 120 MMHG | BODY MASS INDEX: 34.51 KG/M2 | HEIGHT: 69 IN | RESPIRATION RATE: 18 BRPM | BODY MASS INDEX: 34.61 KG/M2 | OXYGEN SATURATION: 100 % | WEIGHT: 233 LBS

## 2021-10-07 DIAGNOSIS — Z00.00 ROUTINE GENERAL MEDICAL EXAMINATION AT A HEALTH CARE FACILITY: Primary | ICD-10-CM

## 2021-12-02 ENCOUNTER — PATIENT MESSAGE (OUTPATIENT)
Dept: INTERNAL MEDICINE | Facility: CLINIC | Age: 51
End: 2021-12-02
Payer: COMMERCIAL

## 2021-12-03 ENCOUNTER — HOSPITAL ENCOUNTER (OUTPATIENT)
Dept: CARDIOLOGY | Facility: CLINIC | Age: 51
Discharge: HOME | End: 2021-12-03
Payer: COMMERCIAL

## 2021-12-03 ENCOUNTER — HOSPITAL ENCOUNTER (OUTPATIENT)
Dept: RADIOLOGY | Facility: HOSPITAL | Age: 51
Discharge: HOME | End: 2021-12-03
Attending: INTERNAL MEDICINE
Payer: COMMERCIAL

## 2021-12-03 ENCOUNTER — HOSPITAL ENCOUNTER (OUTPATIENT)
Dept: RADIOLOGY | Facility: CLINIC | Age: 51
Discharge: HOME | End: 2021-12-03
Attending: INTERNAL MEDICINE
Payer: COMMERCIAL

## 2021-12-03 ENCOUNTER — CLINICAL SUPPORT (OUTPATIENT)
Dept: INTERNAL MEDICINE | Facility: CLINIC | Age: 51
End: 2021-12-03
Payer: COMMERCIAL

## 2021-12-03 ENCOUNTER — OFFICE VISIT (OUTPATIENT)
Dept: INTERNAL MEDICINE | Facility: CLINIC | Age: 51
End: 2021-12-03
Payer: COMMERCIAL

## 2021-12-03 VITALS
WEIGHT: 262 LBS | DIASTOLIC BLOOD PRESSURE: 81 MMHG | HEIGHT: 69 IN | HEART RATE: 93 BPM | BODY MASS INDEX: 38.8 KG/M2 | SYSTOLIC BLOOD PRESSURE: 122 MMHG

## 2021-12-03 VITALS — HEIGHT: 69 IN | WEIGHT: 260 LBS | BODY MASS INDEX: 38.51 KG/M2

## 2021-12-03 DIAGNOSIS — Z00.00 ROUTINE GENERAL MEDICAL EXAMINATION AT A HEALTH CARE FACILITY: ICD-10-CM

## 2021-12-03 DIAGNOSIS — Z00.00 ANNUAL PHYSICAL EXAM: Primary | ICD-10-CM

## 2021-12-03 DIAGNOSIS — Z00.00 ROUTINE GENERAL MEDICAL EXAMINATION AT A HEALTH CARE FACILITY: Primary | ICD-10-CM

## 2021-12-03 DIAGNOSIS — E78.5 DYSLIPIDEMIA, GOAL LDL BELOW 100: ICD-10-CM

## 2021-12-03 DIAGNOSIS — I10 ESSENTIAL HYPERTENSION: ICD-10-CM

## 2021-12-03 LAB
ALBUMIN SERPL BCP-MCNC: 4 G/DL (ref 3.5–5.2)
ALP SERPL-CCNC: 43 U/L (ref 55–135)
ALT SERPL W/O P-5'-P-CCNC: 14 U/L (ref 10–44)
ANION GAP SERPL CALC-SCNC: 8 MMOL/L (ref 8–16)
AST SERPL-CCNC: 18 U/L (ref 10–40)
BILIRUB SERPL-MCNC: 0.9 MG/DL (ref 0.1–1)
BUN SERPL-MCNC: 11 MG/DL (ref 6–20)
CALCIUM SERPL-MCNC: 9.7 MG/DL (ref 8.7–10.5)
CHLORIDE SERPL-SCNC: 105 MMOL/L (ref 95–110)
CHOLEST SERPL-MCNC: 200 MG/DL (ref 120–199)
CHOLEST/HDLC SERPL: 2.6 {RATIO} (ref 2–5)
CO2 SERPL-SCNC: 27 MMOL/L (ref 23–29)
CREAT SERPL-MCNC: 0.8 MG/DL (ref 0.5–1.4)
ERYTHROCYTE [DISTWIDTH] IN BLOOD BY AUTOMATED COUNT: 14.7 % (ref 11.5–14.5)
EST. GFR  (AFRICAN AMERICAN): >60 ML/MIN/1.73 M^2
EST. GFR  (NON AFRICAN AMERICAN): >60 ML/MIN/1.73 M^2
GLUCOSE SERPL-MCNC: 100 MG/DL (ref 70–110)
HCT VFR BLD AUTO: 42 % (ref 37–48.5)
HDLC SERPL-MCNC: 76 MG/DL (ref 40–75)
HDLC SERPL: 38 % (ref 20–50)
HGB BLD-MCNC: 13.5 G/DL (ref 12–16)
LDLC SERPL CALC-MCNC: 103 MG/DL (ref 63–159)
MCH RBC QN AUTO: 28.7 PG (ref 27–31)
MCHC RBC AUTO-ENTMCNC: 32.1 G/DL (ref 32–36)
MCV RBC AUTO: 89 FL (ref 82–98)
NONHDLC SERPL-MCNC: 124 MG/DL
PLATELET # BLD AUTO: 297 K/UL (ref 150–450)
PMV BLD AUTO: 10.4 FL (ref 9.2–12.9)
POTASSIUM SERPL-SCNC: 4 MMOL/L (ref 3.5–5.1)
PROT SERPL-MCNC: 7.6 G/DL (ref 6–8.4)
RBC # BLD AUTO: 4.71 M/UL (ref 4–5.4)
SODIUM SERPL-SCNC: 140 MMOL/L (ref 136–145)
TRIGL SERPL-MCNC: 105 MG/DL (ref 30–150)
TSH SERPL DL<=0.005 MIU/L-ACNC: 2.99 UIU/ML (ref 0.4–4)
WBC # BLD AUTO: 6.82 K/UL (ref 3.9–12.7)

## 2021-12-03 PROCEDURE — 85027 COMPLETE CBC AUTOMATED: CPT | Performed by: INTERNAL MEDICINE

## 2021-12-03 PROCEDURE — 99999 PR PBB SHADOW E&M-EST. PATIENT-LVL III: CPT | Mod: PBBFAC,,, | Performed by: INTERNAL MEDICINE

## 2021-12-03 PROCEDURE — 77063 MAMMO DIGITAL SCREENING BILAT WITH TOMO: ICD-10-PCS | Mod: 26,,, | Performed by: RADIOLOGY

## 2021-12-03 PROCEDURE — 99396 PREV VISIT EST AGE 40-64: CPT | Mod: S$GLB,,, | Performed by: INTERNAL MEDICINE

## 2021-12-03 PROCEDURE — 77067 SCR MAMMO BI INCL CAD: CPT | Mod: 26,,, | Performed by: RADIOLOGY

## 2021-12-03 PROCEDURE — 77067 SCR MAMMO BI INCL CAD: CPT | Mod: TC

## 2021-12-03 PROCEDURE — 80061 LIPID PANEL: CPT | Performed by: INTERNAL MEDICINE

## 2021-12-03 PROCEDURE — 99396 PR PREVENTIVE VISIT,EST,40-64: ICD-10-PCS | Mod: S$GLB,,, | Performed by: INTERNAL MEDICINE

## 2021-12-03 PROCEDURE — 93005 EKG 12-LEAD: ICD-10-PCS | Mod: S$GLB,,, | Performed by: INTERNAL MEDICINE

## 2021-12-03 PROCEDURE — 77063 BREAST TOMOSYNTHESIS BI: CPT | Mod: 26,,, | Performed by: RADIOLOGY

## 2021-12-03 PROCEDURE — 77080 DXA BONE DENSITY AXIAL: CPT | Mod: TC

## 2021-12-03 PROCEDURE — 93010 EKG 12-LEAD: ICD-10-PCS | Mod: S$GLB,,, | Performed by: INTERNAL MEDICINE

## 2021-12-03 PROCEDURE — 99999 PR PBB SHADOW E&M-EST. PATIENT-LVL III: ICD-10-PCS | Mod: PBBFAC,,, | Performed by: INTERNAL MEDICINE

## 2021-12-03 PROCEDURE — 93005 ELECTROCARDIOGRAM TRACING: CPT | Mod: S$GLB,,, | Performed by: INTERNAL MEDICINE

## 2021-12-03 PROCEDURE — 93010 ELECTROCARDIOGRAM REPORT: CPT | Mod: S$GLB,,, | Performed by: INTERNAL MEDICINE

## 2021-12-03 PROCEDURE — 84443 ASSAY THYROID STIM HORMONE: CPT | Performed by: INTERNAL MEDICINE

## 2021-12-03 PROCEDURE — 77080 DEXA BONE DENSITY SPINE HIP: ICD-10-PCS | Mod: 26,,, | Performed by: INTERNAL MEDICINE

## 2021-12-03 PROCEDURE — 77067 MAMMO DIGITAL SCREENING BILAT WITH TOMO: ICD-10-PCS | Mod: 26,,, | Performed by: RADIOLOGY

## 2021-12-03 PROCEDURE — 77080 DXA BONE DENSITY AXIAL: CPT | Mod: 26,,, | Performed by: INTERNAL MEDICINE

## 2021-12-03 PROCEDURE — 80053 COMPREHEN METABOLIC PANEL: CPT | Performed by: INTERNAL MEDICINE

## 2021-12-03 RX ORDER — AMLODIPINE BESYLATE 5 MG/1
5 TABLET ORAL DAILY
Qty: 90 TABLET | Refills: 3 | Status: SHIPPED | OUTPATIENT
Start: 2021-12-03 | End: 2022-12-15

## 2021-12-03 RX ORDER — ATORVASTATIN CALCIUM 20 MG/1
20 TABLET, FILM COATED ORAL DAILY
Qty: 90 TABLET | Refills: 3 | Status: SHIPPED | OUTPATIENT
Start: 2021-12-03 | End: 2022-12-15

## 2021-12-05 ENCOUNTER — TELEPHONE (OUTPATIENT)
Dept: INTERNAL MEDICINE | Facility: CLINIC | Age: 51
End: 2021-12-05
Payer: COMMERCIAL

## 2021-12-07 ENCOUNTER — PATIENT MESSAGE (OUTPATIENT)
Dept: INTERNAL MEDICINE | Facility: CLINIC | Age: 51
End: 2021-12-07
Payer: COMMERCIAL

## 2022-10-21 ENCOUNTER — OFFICE VISIT (OUTPATIENT)
Dept: OBSTETRICS AND GYNECOLOGY | Facility: CLINIC | Age: 52
End: 2022-10-21
Attending: OBSTETRICS & GYNECOLOGY
Payer: COMMERCIAL

## 2022-10-21 VITALS — DIASTOLIC BLOOD PRESSURE: 90 MMHG | SYSTOLIC BLOOD PRESSURE: 124 MMHG | BODY MASS INDEX: 37.8 KG/M2 | WEIGHT: 255.94 LBS

## 2022-10-21 DIAGNOSIS — Z01.419 WELL WOMAN EXAM WITH ROUTINE GYNECOLOGICAL EXAM: Primary | ICD-10-CM

## 2022-10-21 DIAGNOSIS — Z00.00 ROUTINE GENERAL MEDICAL EXAMINATION AT A HEALTH CARE FACILITY: ICD-10-CM

## 2022-10-21 PROCEDURE — 99999 PR PBB SHADOW E&M-EST. PATIENT-LVL III: ICD-10-PCS | Mod: PBBFAC,,, | Performed by: OBSTETRICS & GYNECOLOGY

## 2022-10-21 PROCEDURE — 87624 HPV HI-RISK TYP POOLED RSLT: CPT | Performed by: OBSTETRICS & GYNECOLOGY

## 2022-10-21 PROCEDURE — 99386 PREV VISIT NEW AGE 40-64: CPT | Mod: S$GLB,,, | Performed by: OBSTETRICS & GYNECOLOGY

## 2022-10-21 PROCEDURE — 99999 PR PBB SHADOW E&M-EST. PATIENT-LVL III: CPT | Mod: PBBFAC,,, | Performed by: OBSTETRICS & GYNECOLOGY

## 2022-10-21 PROCEDURE — 99386 PR PREVENTIVE VISIT,NEW,40-64: ICD-10-PCS | Mod: S$GLB,,, | Performed by: OBSTETRICS & GYNECOLOGY

## 2022-10-21 PROCEDURE — 88142 CYTOPATH C/V THIN LAYER: CPT | Performed by: OBSTETRICS & GYNECOLOGY

## 2022-10-21 NOTE — PROGRESS NOTES
CC: Well woman exam    Jack Bay is a 51 y.o. female  presents for well woman exam.  LMP: No LMP recorded. Patient is postmenopausal..  No issues, problems, or complaints.  SHe is UTD on mmg, colon screening, labs have been done by PCP.  Recent move here in 2019 from [place], works at [employer], research psychologist.  Last pap was normal.  No symptoms of menopause.    Past Medical History:   Diagnosis Date    Hypertension      Past Surgical History:   Procedure Laterality Date    KNEE CARTILAGE SURGERY      2016 torn meniscus     Social History     Socioeconomic History    Marital status: Single   Tobacco Use    Smoking status: Never    Smokeless tobacco: Never   Substance and Sexual Activity    Alcohol use: Yes     Comment: occ    Drug use: Never    Sexual activity: Yes     Partners: Male     Birth control/protection: None     Family History   Problem Relation Age of Onset    Hypertension Mother     Heart disease Father 60        valvular heart disease, rheumatic heart dz    Ovarian cancer Sister     Cancer Maternal Aunt         cancer    COPD Maternal Uncle     Breast cancer Paternal Aunt 67     OB History          0    Para   0    Term   0       0    AB   0    Living   0         SAB   0    IAB   0    Ectopic   0    Multiple   0    Live Births   0                 BP (!) 124/90 (BP Location: Right arm, Patient Position: Sitting, BP Method: Large (Manual))   Wt 116.1 kg (255 lb 15.3 oz)   BMI 37.80 kg/m²       ROS:  GENERAL: Denies weight gain or weight loss. Feeling well overall.   SKIN: Denies rash or lesions.   HEAD: Denies head injury or headache.   NODES: Denies enlarged lymph nodes.   CHEST: Denies chest pain or shortness of breath.   CARDIOVASCULAR: Denies palpitations or left sided chest pain.   ABDOMEN: No abdominal pain, constipation, diarrhea, nausea, vomiting or rectal bleeding.   URINARY: No frequency, dysuria, hematuria, or burning on urination.  REPRODUCTIVE: See HPI.    BREASTS: The patient performs breast self-examination and denies pain, lumps, or nipple discharge.   HEMATOLOGIC: No easy bruisability or excessive bleeding.   MUSCULOSKELETAL: Denies joint pain or swelling.   NEUROLOGIC: Denies syncope or weakness.   PSYCHIATRIC: Denies depression, anxiety or mood swings.    PHYSICAL EXAM:  APPEARANCE: Well nourished, well developed female, in no acute distress.  AFFECT: WNL, alert and oriented x 3  SKIN: No acne or hirsutism  NECK: Neck symmetric without masses or thyromegaly  NODES: No inguinal, cervical, axillary, or femoral lymph node enlargement  CHEST: Good respiratory effect  ABDOMEN: Soft.  No tenderness or masses.  No hepatosplenomegaly.  No hernias.  BREASTS: Symmetrical, no skin changes or visible lesions.  No palpable masses, nipple discharge bilaterally.  PELVIC: Normal external genitalia without lesions.  Normal hair distribution.  Adequate perineal body, normal urethral meatus.  Vagina moist and well rugated without lesions or discharge.  Cervix pink, without lesions, discharge or tenderness.  No significant cystocele or rectocele.  Bimanual exam shows uterus to be normal size, regular, mobile and nontender.  Adnexa without masses or tenderness.    EXTREMITIES: No edema.    Well woman exam with routine gynecological exam  -     Liquid-Based Pap Smear, Screening  -     HPV High Risk Genotypes, PCR    Routine general medical examination at a health care facility  -     Ambulatory referral/consult to Obstetrics / Gynecology          Patient was counseled today on A.C.S. Pap guidelines and recommendations for yearly pelvic exams, mammograms and monthly self breast exams; to see her PCP for other health maintenance.     No follow-ups on file.                    Answers submitted by the patient for this visit:  Gynecologic Exam Questionnaire  (Submitted on 7/4/2022)  Chief Complaint: Gynecologic exam  genital itching: No  genital lesions: No  genital odor: No  genital  rash: No  missed menses: No  pelvic pain: No  vaginal bleeding: No  vaginal discharge: No  Pregnant now?: No  abdominal pain: No  anorexia: No  back pain: No  chills: No  constipation: No  diarrhea: No  discolored urine: No  dysuria: No  fever: No  flank pain: No  frequency: No  headaches: No  hematuria: No  nausea: No  painful intercourse: No  rash: No  urgency: No  vomiting: No  Vaginal bleeding: no bleeding  Passing clots?: No  Passing tissue?: No  Sexual activity: sexually active  Partner with STD symptoms: no  Birth control: nothing  Menstrual history: postmenopausal  STD: No  abdominal surgery: No   section: No  Ectopic pregnancy: No  Endometriosis: No  herpes simplex: No  gynecological surgery: No  menorrhagia: No  metrorrhagia: No  miscarriage: No  ovarian cysts: No  perineal abscess: No  PID: No  terminated pregnancy: No  vaginosis: No

## 2022-10-27 ENCOUNTER — PATIENT MESSAGE (OUTPATIENT)
Dept: OBSTETRICS AND GYNECOLOGY | Facility: CLINIC | Age: 52
End: 2022-10-27
Payer: COMMERCIAL

## 2022-10-27 LAB
HPV HR 12 DNA SPEC QL NAA+PROBE: POSITIVE
HPV16 AG SPEC QL: NEGATIVE
HPV18 DNA SPEC QL NAA+PROBE: NEGATIVE

## 2022-10-28 LAB
FINAL PATHOLOGIC DIAGNOSIS: NORMAL
Lab: NORMAL

## 2022-11-29 ENCOUNTER — PATIENT MESSAGE (OUTPATIENT)
Dept: INTERNAL MEDICINE | Facility: CLINIC | Age: 52
End: 2022-11-29
Payer: COMMERCIAL

## 2023-04-19 DIAGNOSIS — Z00.00 ROUTINE GENERAL MEDICAL EXAMINATION AT A HEALTH CARE FACILITY: Primary | ICD-10-CM

## 2023-06-16 ENCOUNTER — OFFICE VISIT (OUTPATIENT)
Dept: INTERNAL MEDICINE | Facility: CLINIC | Age: 53
End: 2023-06-16
Payer: COMMERCIAL

## 2023-06-16 ENCOUNTER — HOSPITAL ENCOUNTER (OUTPATIENT)
Dept: CARDIOLOGY | Facility: CLINIC | Age: 53
Discharge: HOME | End: 2023-06-16
Payer: COMMERCIAL

## 2023-06-16 ENCOUNTER — CLINICAL SUPPORT (OUTPATIENT)
Dept: INTERNAL MEDICINE | Facility: CLINIC | Age: 53
End: 2023-06-16
Payer: COMMERCIAL

## 2023-06-16 ENCOUNTER — HOSPITAL ENCOUNTER (OUTPATIENT)
Dept: RADIOLOGY | Facility: HOSPITAL | Age: 53
Discharge: HOME | End: 2023-06-16
Attending: INTERNAL MEDICINE
Payer: COMMERCIAL

## 2023-06-16 VITALS
WEIGHT: 256 LBS | SYSTOLIC BLOOD PRESSURE: 126 MMHG | HEIGHT: 69 IN | HEART RATE: 86 BPM | BODY MASS INDEX: 37.92 KG/M2 | DIASTOLIC BLOOD PRESSURE: 78 MMHG

## 2023-06-16 DIAGNOSIS — Z00.00 ROUTINE GENERAL MEDICAL EXAMINATION AT A HEALTH CARE FACILITY: ICD-10-CM

## 2023-06-16 DIAGNOSIS — Z12.11 SCREEN FOR COLON CANCER: ICD-10-CM

## 2023-06-16 DIAGNOSIS — E78.5 DYSLIPIDEMIA, GOAL LDL BELOW 100: ICD-10-CM

## 2023-06-16 DIAGNOSIS — Z00.00 ANNUAL PHYSICAL EXAM: Primary | ICD-10-CM

## 2023-06-16 DIAGNOSIS — H93.12 LEFT-SIDED TINNITUS: ICD-10-CM

## 2023-06-16 DIAGNOSIS — I10 ESSENTIAL HYPERTENSION: ICD-10-CM

## 2023-06-16 DIAGNOSIS — Z00.00 ROUTINE GENERAL MEDICAL EXAMINATION AT A HEALTH CARE FACILITY: Primary | ICD-10-CM

## 2023-06-16 LAB
ALBUMIN SERPL BCP-MCNC: 4 G/DL (ref 3.5–5.2)
ALP SERPL-CCNC: 48 U/L (ref 55–135)
ALT SERPL W/O P-5'-P-CCNC: 12 U/L (ref 10–44)
ANION GAP SERPL CALC-SCNC: 8 MMOL/L (ref 8–16)
AST SERPL-CCNC: 15 U/L (ref 10–40)
BILIRUB SERPL-MCNC: 0.9 MG/DL (ref 0.1–1)
BUN SERPL-MCNC: 10 MG/DL (ref 6–20)
CALCIUM SERPL-MCNC: 9.8 MG/DL (ref 8.7–10.5)
CHLORIDE SERPL-SCNC: 106 MMOL/L (ref 95–110)
CHOLEST SERPL-MCNC: 195 MG/DL (ref 120–199)
CHOLEST/HDLC SERPL: 2.6 {RATIO} (ref 2–5)
CO2 SERPL-SCNC: 25 MMOL/L (ref 23–29)
CREAT SERPL-MCNC: 0.8 MG/DL (ref 0.5–1.4)
ERYTHROCYTE [DISTWIDTH] IN BLOOD BY AUTOMATED COUNT: 14.6 % (ref 11.5–14.5)
EST. GFR  (NO RACE VARIABLE): >60 ML/MIN/1.73 M^2
ESTIMATED AVG GLUCOSE: 114 MG/DL (ref 68–131)
GLUCOSE SERPL-MCNC: 91 MG/DL (ref 70–110)
HBA1C MFR BLD: 5.6 % (ref 4–5.6)
HCT VFR BLD AUTO: 44.5 % (ref 37–48.5)
HDLC SERPL-MCNC: 76 MG/DL (ref 40–75)
HDLC SERPL: 39 % (ref 20–50)
HGB BLD-MCNC: 13.9 G/DL (ref 12–16)
LDLC SERPL CALC-MCNC: 100.6 MG/DL (ref 63–159)
MCH RBC QN AUTO: 27.7 PG (ref 27–31)
MCHC RBC AUTO-ENTMCNC: 31.2 G/DL (ref 32–36)
MCV RBC AUTO: 89 FL (ref 82–98)
NONHDLC SERPL-MCNC: 119 MG/DL
PLATELET # BLD AUTO: 275 K/UL (ref 150–450)
PMV BLD AUTO: 11.1 FL (ref 9.2–12.9)
POTASSIUM SERPL-SCNC: 4 MMOL/L (ref 3.5–5.1)
PROT SERPL-MCNC: 8.1 G/DL (ref 6–8.4)
RBC # BLD AUTO: 5.01 M/UL (ref 4–5.4)
SODIUM SERPL-SCNC: 139 MMOL/L (ref 136–145)
TRIGL SERPL-MCNC: 92 MG/DL (ref 30–150)
TSH SERPL DL<=0.005 MIU/L-ACNC: 2.17 UIU/ML (ref 0.4–4)
WBC # BLD AUTO: 6.32 K/UL (ref 3.9–12.7)

## 2023-06-16 PROCEDURE — 77067 SCR MAMMO BI INCL CAD: CPT | Mod: TC

## 2023-06-16 PROCEDURE — 77063 MAMMO DIGITAL SCREENING BILAT WITH TOMO: ICD-10-PCS | Mod: 26,,, | Performed by: RADIOLOGY

## 2023-06-16 PROCEDURE — 93005 EKG 12-LEAD: ICD-10-PCS | Mod: S$GLB,,, | Performed by: INTERNAL MEDICINE

## 2023-06-16 PROCEDURE — 77067 MAMMO DIGITAL SCREENING BILAT WITH TOMO: ICD-10-PCS | Mod: 26,,, | Performed by: RADIOLOGY

## 2023-06-16 PROCEDURE — 80061 LIPID PANEL: CPT | Performed by: INTERNAL MEDICINE

## 2023-06-16 PROCEDURE — 93010 ELECTROCARDIOGRAM REPORT: CPT | Mod: S$GLB,,, | Performed by: INTERNAL MEDICINE

## 2023-06-16 PROCEDURE — 99999 PR PBB SHADOW E&M-EST. PATIENT-LVL III: ICD-10-PCS | Mod: PBBFAC,,, | Performed by: INTERNAL MEDICINE

## 2023-06-16 PROCEDURE — 93005 ELECTROCARDIOGRAM TRACING: CPT | Mod: S$GLB,,, | Performed by: INTERNAL MEDICINE

## 2023-06-16 PROCEDURE — 99396 PREV VISIT EST AGE 40-64: CPT | Mod: S$GLB,,, | Performed by: INTERNAL MEDICINE

## 2023-06-16 PROCEDURE — 80053 COMPREHEN METABOLIC PANEL: CPT | Performed by: INTERNAL MEDICINE

## 2023-06-16 PROCEDURE — 83036 HEMOGLOBIN GLYCOSYLATED A1C: CPT | Performed by: INTERNAL MEDICINE

## 2023-06-16 PROCEDURE — 36415 COLL VENOUS BLD VENIPUNCTURE: CPT | Performed by: INTERNAL MEDICINE

## 2023-06-16 PROCEDURE — 99999 PR PBB SHADOW E&M-EST. PATIENT-LVL I: CPT | Mod: PBBFAC,,,

## 2023-06-16 PROCEDURE — 85027 COMPLETE CBC AUTOMATED: CPT | Performed by: INTERNAL MEDICINE

## 2023-06-16 PROCEDURE — 84443 ASSAY THYROID STIM HORMONE: CPT | Performed by: INTERNAL MEDICINE

## 2023-06-16 PROCEDURE — 99396 PR PREVENTIVE VISIT,EST,40-64: ICD-10-PCS | Mod: S$GLB,,, | Performed by: INTERNAL MEDICINE

## 2023-06-16 PROCEDURE — 99999 PR PBB SHADOW E&M-EST. PATIENT-LVL III: CPT | Mod: PBBFAC,,, | Performed by: INTERNAL MEDICINE

## 2023-06-16 PROCEDURE — 99999 PR PBB SHADOW E&M-EST. PATIENT-LVL I: ICD-10-PCS | Mod: PBBFAC,,,

## 2023-06-16 PROCEDURE — 93010 EKG 12-LEAD: ICD-10-PCS | Mod: S$GLB,,, | Performed by: INTERNAL MEDICINE

## 2023-06-16 PROCEDURE — 77067 SCR MAMMO BI INCL CAD: CPT | Mod: 26,,, | Performed by: RADIOLOGY

## 2023-06-16 PROCEDURE — 77063 BREAST TOMOSYNTHESIS BI: CPT | Mod: 26,,, | Performed by: RADIOLOGY

## 2023-06-16 RX ORDER — ATORVASTATIN CALCIUM 20 MG/1
20 TABLET, FILM COATED ORAL DAILY
Qty: 90 TABLET | Refills: 3 | Status: SHIPPED | OUTPATIENT
Start: 2023-06-16 | End: 2023-10-14 | Stop reason: SDUPTHER

## 2023-06-16 RX ORDER — AMLODIPINE BESYLATE 5 MG/1
5 TABLET ORAL DAILY
Qty: 90 TABLET | Refills: 3 | Status: SHIPPED | OUTPATIENT
Start: 2023-06-16 | End: 2023-10-14 | Stop reason: SDUPTHER

## 2023-06-16 RX ORDER — PREDNISONE 20 MG/1
20 TABLET ORAL DAILY
Qty: 3 TABLET | Refills: 0 | Status: SHIPPED | OUTPATIENT
Start: 2023-06-16 | End: 2023-06-19

## 2023-06-18 NOTE — PROGRESS NOTES
Subjective:       Patient ID: Jack Bay is a 52 y.o. female who presents today for:    Chief Complaint:   Chief Complaint   Patient presents with    Executive Health       HPI:52-year-old nonsmoking female here for her 3rd executive health physical.  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 She has a past medical history significant for controlled hypertension and dyslipidemia.    Today she is complaining of ringing in her ear which is very subtle but she is never had it before and is only the left ear.  She did have an upper respiratory tract infection a few weeks before.  The ringing is fairly constant and low-level.  She is no other complaints of vertigo or nausea or sore throat.   Lab work done today consisted of CMP, CBC, lipid profile, TSH and hemoglobin A1c in all were within normal limits.  Her EKG done today was essentially unremarkable with again some question of left atrial enlargement but no changes from prior years.  She is having her mammogram done today.  She will be due for colorectal cancer screening and she prefers the form of Cologuard and that will be this September.  She went through menopause about the age of 50 and she had a normal bone mineral density in 2021.    Review of Systems   Constitutional:  Negative for chills, fever and weight loss.   HENT:  Positive for tinnitus. Negative for sore throat.         As per HPI left ear    Eyes:  Negative for blurred vision and double vision.   Respiratory:  Negative for cough and shortness of breath.    Cardiovascular:  Negative for chest pain and palpitations.   Gastrointestinal:  Negative for constipation, diarrhea, nausea and vomiting.   Genitourinary:  Negative for dysuria and hematuria.   Musculoskeletal:  Negative for joint pain and myalgias.   Skin:  Negative for itching and rash.   Neurological:  Negative for sensory change, focal weakness and headaches.   Endo/Heme/Allergies:  Does not bruise/bleed easily.   Psychiatric/Behavioral:   "Negative for depression and suicidal ideas.       Medications:  Outpatient Encounter Medications as of 6/16/2023   Medication Sig Dispense Refill    amLODIPine (NORVASC) 5 MG tablet Take 1 tablet (5 mg total) by mouth once daily. 90 tablet 3    atorvastatin (LIPITOR) 20 MG tablet Take 1 tablet (20 mg total) by mouth once daily. 90 tablet 3    predniSONE (DELTASONE) 20 MG tablet Take 1 tablet (20 mg total) by mouth once daily. for 3 days 3 tablet 0    [DISCONTINUED] amLODIPine (NORVASC) 5 MG tablet TAKE 1 TABLET BY MOUTH EVERY DAY 30 tablet 11    [DISCONTINUED] atorvastatin (LIPITOR) 20 MG tablet TAKE 1 TABLET BY MOUTH EVERY DAY 30 tablet 11     No facility-administered encounter medications on file as of 6/16/2023.       Allergies:  Review of patient's allergies indicates:   Allergen Reactions    Pcn [penicillins] Hives       Health Maintenance:  Immunization History   Administered Date(s) Administered    COVID-19, MRNA, LN-S, PF (MODERNA FULL 0.5 ML DOSE) 03/23/2021, 04/20/2021, 11/22/2021    COVID-19, mRNA, LNP-S, bivalent booster, PF (Moderna Omicron) 10/07/2022    Influenza 10/04/2020    Influenza - Quadrivalent - MDCK - PF 10/03/2020, 10/07/2022    Influenza - Quadrivalent - PF *Preferred* (6 months and older) 10/11/2021    Zoster Recombinant 09/18/2020, 01/15/2021, 05/22/2021      Health Maintenance   Topic Date Due    Hepatitis C Screening  Never done    Mammogram  12/03/2022    DEXA Scan  12/03/2023    Lipid Panel  06/16/2028    TETANUS VACCINE  07/04/2028          Objective:      Vital Signs  Pulse: 86  BP: 126/78  Height and Weight  Height: 5' 9" (175.3 cm)  Weight: 116.1 kg (256 lb)  BSA (Calculated - sq m): 2.38 sq meters  BMI (Calculated): 37.8  Weight in (lb) to have BMI = 25: 168.9]    Physical Exam  HENT:      Head: Normocephalic and atraumatic.   Eyes:      General: No scleral icterus.  Cardiovascular:      Rate and Rhythm: Normal rate and regular rhythm.      Heart sounds: No murmur " heard.  Pulmonary:      Effort: Pulmonary effort is normal.      Breath sounds: Normal breath sounds. No rales.   Abdominal:      General: Bowel sounds are normal.      Palpations: Abdomen is soft.      Tenderness: There is no abdominal tenderness.   Musculoskeletal:         General: No tenderness.      Cervical back: Neck supple.   Neurological:      Mental Status: She is alert and oriented to person, place, and time.   Psychiatric:         Mood and Affect: Mood normal.         Behavior: Behavior normal.         Thought Content: Thought content normal.         Judgment: Judgment normal.      Lab Results   Component Value Date    WBC 6.32 06/16/2023    HGB 13.9 06/16/2023    HCT 44.5 06/16/2023     06/16/2023    CHOL 195 06/16/2023    TRIG 92 06/16/2023    HDL 76 (H) 06/16/2023    ALT 12 06/16/2023    AST 15 06/16/2023     06/16/2023    K 4.0 06/16/2023     06/16/2023    CREATININE 0.8 06/16/2023    BUN 10 06/16/2023    CO2 25 06/16/2023    TSH 2.174 06/16/2023    HGBA1C 5.6 06/16/2023     Assessment/plan:     Jack Bay is a 52 y.o.female with:    Annual physical exam     Health Maintenance   Topic Date Due    Hepatitis C Screening  Never done    Mammogram  06/16/2024    DEXA Scan  12/03/2023    Lipid Panel  06/16/2028    TETANUS VACCINE  07/04/2028     Health Maintenance Reviewed    Essential hypertension  -     continue with amLODIPine (NORVASC) 5 MG tablet; Take 1 tablet (5 mg total) by mouth once daily.  Dispense: 90 tablet; Refill: 3    Dyslipidemia, goal LDL below 100  -     continue with atorvastatin (LIPITOR) 20 MG tablet; Take 1 tablet (20 mg total) by mouth once daily.  Dispense: 90 tablet; Refill: 3    Screen for colon cancer  -     Cologuard Screening (Multitarget Stool DNA); Future; due September 2023     Left-sided tinnitus- likely residual from URI 2 weeks   -     predniSONE (DELTASONE) 20 MG tablet; Take 1 tablet (20 mg total) by mouth once daily. for 3 days  Dispense: 3  tablet; Refill: 0        No future appointments.    Veronica Conde MD  [facility]   Expiration Date (Month Year): 6/11/18

## 2023-10-12 LAB — NONINV COLON CA DNA+OCC BLD SCRN STL QL: NEGATIVE

## 2023-10-14 DIAGNOSIS — E78.5 DYSLIPIDEMIA, GOAL LDL BELOW 100: ICD-10-CM

## 2023-10-14 DIAGNOSIS — I10 ESSENTIAL HYPERTENSION: ICD-10-CM

## 2023-10-14 NOTE — TELEPHONE ENCOUNTER
No care due was identified.  Health [facility] Embedded Care Due Messages. Reference number: [id number].   10/14/2023 12:50:33 PM CDT

## 2023-10-15 RX ORDER — AMLODIPINE BESYLATE 5 MG/1
5 TABLET ORAL DAILY
Qty: 90 TABLET | Refills: 3 | Status: SHIPPED | OUTPATIENT
Start: 2023-10-15 | End: 2024-01-09 | Stop reason: SDUPTHER

## 2023-10-15 RX ORDER — ATORVASTATIN CALCIUM 20 MG/1
20 TABLET, FILM COATED ORAL DAILY
Qty: 90 TABLET | Refills: 3 | Status: SHIPPED | OUTPATIENT
Start: 2023-10-15 | End: 2024-01-09 | Stop reason: SDUPTHER

## 2023-10-19 ENCOUNTER — PATIENT MESSAGE (OUTPATIENT)
Dept: INTERNAL MEDICINE | Facility: CLINIC | Age: 53
End: 2023-10-19
Payer: COMMERCIAL

## 2024-01-09 DIAGNOSIS — E78.5 DYSLIPIDEMIA, GOAL LDL BELOW 100: ICD-10-CM

## 2024-01-09 DIAGNOSIS — I10 ESSENTIAL HYPERTENSION: ICD-10-CM

## 2024-01-09 RX ORDER — ATORVASTATIN CALCIUM 20 MG/1
20 TABLET, FILM COATED ORAL DAILY
Qty: 90 TABLET | Refills: 3 | Status: SHIPPED | OUTPATIENT
Start: 2024-01-09

## 2024-01-09 RX ORDER — AMLODIPINE BESYLATE 5 MG/1
5 TABLET ORAL DAILY
Qty: 90 TABLET | Refills: 3 | Status: SHIPPED | OUTPATIENT
Start: 2024-01-09

## 2024-01-09 NOTE — TELEPHONE ENCOUNTER
Unable to retrieve patient chart and identify care due.  [facility] Embedded Care Due Messages. Reference number: [id number].   1/09/2024 3:23:33 PM CST

## 2024-04-10 DIAGNOSIS — Z00.00 ROUTINE MEDICAL EXAM: Primary | ICD-10-CM

## 2024-04-14 DIAGNOSIS — I10 ESSENTIAL HYPERTENSION: ICD-10-CM

## 2024-04-14 NOTE — TELEPHONE ENCOUNTER
Care Due:                  Date            Visit Type   Department     Provider  --------------------------------------------------------------------------------                                             [facility]                              ADMIT &      HEALTH -                              REVIEW EXEC   INTERNAL  Last Visit: 06-      CHECK        MEDICINE       Veronica Pardo                                           [facility]                               ADMIT &      HEALTH -                              REVIEW EXEC   INTERNAL  Next Visit: 09-      CHECK        MEDICINE       Veronica Pardo                                                            Last  Test          Frequency    Reason                     Performed    Due Date  --------------------------------------------------------------------------------    CMP.........  12 months..  atorvastatin.............  06-   06-    Lipid Panel.  12 months..  atorvastatin.............  06-   06-    [facility] [employer] Embedded Care Due Messages. Reference number: [id number].   4/14/2024 9:57:48 AM CDT

## 2024-04-15 RX ORDER — AMLODIPINE BESYLATE 5 MG/1
5 TABLET ORAL DAILY
Qty: 90 TABLET | Refills: 3 | Status: SHIPPED | OUTPATIENT
Start: 2024-04-15

## 2024-04-24 ENCOUNTER — PATIENT MESSAGE (OUTPATIENT)
Dept: INTERNAL MEDICINE | Facility: CLINIC | Age: 54
End: 2024-04-24
Payer: COMMERCIAL

## 2024-04-25 RX ORDER — ALBUTEROL SULFATE 90 UG/1
1 AEROSOL, METERED RESPIRATORY (INHALATION) EVERY 6 HOURS PRN
Qty: 18 G | Refills: 0 | Status: SHIPPED | OUTPATIENT
Start: 2024-04-25 | End: 2025-04-25

## 2024-04-25 RX ORDER — AZITHROMYCIN 250 MG/1
TABLET, FILM COATED ORAL
Qty: 6 TABLET | Refills: 0 | Status: SHIPPED | OUTPATIENT
Start: 2024-04-25 | End: 2024-04-30

## 2024-07-14 ENCOUNTER — PATIENT MESSAGE (OUTPATIENT)
Dept: INTERNAL MEDICINE | Facility: CLINIC | Age: 54
End: 2024-07-14
Payer: COMMERCIAL

## 2024-07-18 DIAGNOSIS — I10 ESSENTIAL HYPERTENSION: ICD-10-CM

## 2024-07-18 RX ORDER — AMLODIPINE BESYLATE 5 MG/1
5 TABLET ORAL DAILY
Qty: 90 TABLET | Refills: 3 | Status: SHIPPED | OUTPATIENT
Start: 2024-07-18

## 2024-07-18 NOTE — TELEPHONE ENCOUNTER
Care Due:                  Date            Visit Type   Department     Provider  --------------------------------------------------------------------------------                                             [facility]                              ADMIT &      HEALTH -                              REVIEW EXEC   INTERNAL  Last Visit: 06-      CHECK        MEDICINE       Veronica Pardo                                           [facility]                               ADMIT &      HEALTH -                              REVIEW EXEC   INTERNAL  Next Visit: 09-      CHECK        MEDICINE       Veronica Pardo                                                            Last  Test          Frequency    Reason                     Performed    Due Date  --------------------------------------------------------------------------------    CMP.........  12 months..  atorvastatin.............  06-   06-    Lipid Panel.  12 months..  atorvastatin.............  06-   06-    [employer] Embedded Care Due Messages. Reference number: [id number].   7/18/2024 9:44:49 AM CDT

## 2024-09-20 ENCOUNTER — HOSPITAL ENCOUNTER (OUTPATIENT)
Dept: CARDIOLOGY | Facility: CLINIC | Age: 54
Discharge: HOME | End: 2024-09-20
Attending: INTERNAL MEDICINE
Payer: COMMERCIAL

## 2024-09-20 ENCOUNTER — CLINICAL SUPPORT (OUTPATIENT)
Dept: INTERNAL MEDICINE | Facility: CLINIC | Age: 54
End: 2024-09-20
Attending: INTERNAL MEDICINE
Payer: COMMERCIAL

## 2024-09-20 ENCOUNTER — HOSPITAL ENCOUNTER (OUTPATIENT)
Dept: RADIOLOGY | Facility: HOSPITAL | Age: 54
Discharge: HOME | End: 2024-09-20
Attending: INTERNAL MEDICINE
Payer: COMMERCIAL

## 2024-09-20 ENCOUNTER — PATIENT MESSAGE (OUTPATIENT)
Dept: INTERNAL MEDICINE | Facility: CLINIC | Age: 54
End: 2024-09-20

## 2024-09-20 ENCOUNTER — HOSPITAL ENCOUNTER (OUTPATIENT)
Dept: RADIOLOGY | Facility: CLINIC | Age: 54
Discharge: HOME | End: 2024-09-20
Attending: INTERNAL MEDICINE
Payer: COMMERCIAL

## 2024-09-20 VITALS
OXYGEN SATURATION: 97 % | HEIGHT: 69 IN | SYSTOLIC BLOOD PRESSURE: 132 MMHG | BODY MASS INDEX: 37.18 KG/M2 | HEART RATE: 85 BPM | WEIGHT: 251 LBS | DIASTOLIC BLOOD PRESSURE: 80 MMHG

## 2024-09-20 DIAGNOSIS — Z00.00 ANNUAL PHYSICAL EXAM: Primary | ICD-10-CM

## 2024-09-20 DIAGNOSIS — Z00.00 ROUTINE MEDICAL EXAM: ICD-10-CM

## 2024-09-20 DIAGNOSIS — Z78.0 POSTMENOPAUSAL: ICD-10-CM

## 2024-09-20 DIAGNOSIS — E78.5 DYSLIPIDEMIA, GOAL LDL BELOW 100: ICD-10-CM

## 2024-09-20 DIAGNOSIS — I10 ESSENTIAL HYPERTENSION: ICD-10-CM

## 2024-09-20 LAB
ALBUMIN SERPL BCP-MCNC: 3.9 G/DL (ref 3.5–5.2)
ALP SERPL-CCNC: 44 U/L (ref 55–135)
ALT SERPL W/O P-5'-P-CCNC: 12 U/L (ref 10–44)
ANION GAP SERPL CALC-SCNC: 7 MMOL/L (ref 8–16)
AST SERPL-CCNC: 16 U/L (ref 10–40)
BILIRUB SERPL-MCNC: 0.8 MG/DL (ref 0.1–1)
BUN SERPL-MCNC: 11 MG/DL (ref 6–20)
CALCIUM SERPL-MCNC: 9.8 MG/DL (ref 8.7–10.5)
CHLORIDE SERPL-SCNC: 106 MMOL/L (ref 95–110)
CHOLEST SERPL-MCNC: 212 MG/DL (ref 120–199)
CHOLEST/HDLC SERPL: 3.1 {RATIO} (ref 2–5)
CO2 SERPL-SCNC: 26 MMOL/L (ref 23–29)
CREAT SERPL-MCNC: 0.8 MG/DL (ref 0.5–1.4)
ERYTHROCYTE [DISTWIDTH] IN BLOOD BY AUTOMATED COUNT: 14.6 % (ref 11.5–14.5)
EST. GFR  (NO RACE VARIABLE): >60 ML/MIN/1.73 M^2
ESTIMATED AVG GLUCOSE: 114 MG/DL (ref 68–131)
GLUCOSE SERPL-MCNC: 99 MG/DL (ref 70–110)
HBA1C MFR BLD: 5.6 % (ref 4–5.6)
HCT VFR BLD AUTO: 43.6 % (ref 37–48.5)
HCV AB SERPL QL IA: NORMAL
HDLC SERPL-MCNC: 69 MG/DL (ref 40–75)
HDLC SERPL: 32.5 % (ref 20–50)
HGB BLD-MCNC: 14 G/DL (ref 12–16)
LDLC SERPL CALC-MCNC: 126.8 MG/DL (ref 63–159)
MCH RBC QN AUTO: 28.9 PG (ref 27–31)
MCHC RBC AUTO-ENTMCNC: 32.1 G/DL (ref 32–36)
MCV RBC AUTO: 90 FL (ref 82–98)
NONHDLC SERPL-MCNC: 143 MG/DL
OHS QRS DURATION: 84 MS
OHS QTC CALCULATION: 415 MS
PLATELET # BLD AUTO: 285 K/UL (ref 150–450)
PMV BLD AUTO: 11.1 FL (ref 9.2–12.9)
POTASSIUM SERPL-SCNC: 4 MMOL/L (ref 3.5–5.1)
PROT SERPL-MCNC: 7.8 G/DL (ref 6–8.4)
RBC # BLD AUTO: 4.85 M/UL (ref 4–5.4)
SODIUM SERPL-SCNC: 139 MMOL/L (ref 136–145)
TRIGL SERPL-MCNC: 81 MG/DL (ref 30–150)
TSH SERPL DL<=0.005 MIU/L-ACNC: 2.43 UIU/ML (ref 0.4–4)
WBC # BLD AUTO: 6.21 K/UL (ref 3.9–12.7)

## 2024-09-20 PROCEDURE — 93010 ELECTROCARDIOGRAM REPORT: CPT | Mod: S$GLB,,, | Performed by: INTERNAL MEDICINE

## 2024-09-20 PROCEDURE — 71046 X-RAY EXAM CHEST 2 VIEWS: CPT | Mod: TC,FY

## 2024-09-20 PROCEDURE — 99396 PREV VISIT EST AGE 40-64: CPT | Mod: S$GLB,,, | Performed by: INTERNAL MEDICINE

## 2024-09-20 PROCEDURE — 85027 COMPLETE CBC AUTOMATED: CPT | Performed by: INTERNAL MEDICINE

## 2024-09-20 PROCEDURE — 80053 COMPREHEN METABOLIC PANEL: CPT | Performed by: INTERNAL MEDICINE

## 2024-09-20 PROCEDURE — 99999 PR PBB SHADOW E&M-EST. PATIENT-LVL I: CPT | Mod: PBBFAC,,,

## 2024-09-20 PROCEDURE — 77067 SCR MAMMO BI INCL CAD: CPT | Mod: TC

## 2024-09-20 PROCEDURE — 83036 HEMOGLOBIN GLYCOSYLATED A1C: CPT | Performed by: INTERNAL MEDICINE

## 2024-09-20 PROCEDURE — 71046 X-RAY EXAM CHEST 2 VIEWS: CPT | Mod: 26,,, | Performed by: RADIOLOGY

## 2024-09-20 PROCEDURE — 93005 ELECTROCARDIOGRAM TRACING: CPT | Mod: S$GLB,,, | Performed by: INTERNAL MEDICINE

## 2024-09-20 PROCEDURE — 77063 BREAST TOMOSYNTHESIS BI: CPT | Mod: 26,,, | Performed by: RADIOLOGY

## 2024-09-20 PROCEDURE — 84443 ASSAY THYROID STIM HORMONE: CPT | Performed by: INTERNAL MEDICINE

## 2024-09-20 PROCEDURE — 77067 SCR MAMMO BI INCL CAD: CPT | Mod: 26,,, | Performed by: RADIOLOGY

## 2024-09-20 PROCEDURE — 99999 PR PBB SHADOW E&M-EST. PATIENT-LVL III: CPT | Mod: PBBFAC,,, | Performed by: INTERNAL MEDICINE

## 2024-09-20 PROCEDURE — 77080 DXA BONE DENSITY AXIAL: CPT | Mod: TC

## 2024-09-20 PROCEDURE — 80061 LIPID PANEL: CPT | Performed by: INTERNAL MEDICINE

## 2024-09-20 PROCEDURE — 86803 HEPATITIS C AB TEST: CPT | Performed by: INTERNAL MEDICINE

## 2024-09-20 PROCEDURE — 77080 DXA BONE DENSITY AXIAL: CPT | Mod: 26,,, | Performed by: INTERNAL MEDICINE

## 2024-09-22 NOTE — PROGRESS NOTES
Subjective:       Patient ID: Jack Bay is a 53 y.o. female who presents today for:    Chief Complaint:   Chief Complaint   Patient presents with    Executive Health     Has been well       HPI:  Very pleasant 53-year-old nonsmoking female doing executive health physical. She work for [employer].   She has a past medical history significant for well-controlled essential hypertension and dyslipidemia.  She has no new complaints today except she would like to consider hormone replacement therapy in his asking for recommendations in physicians.  She exercises regularly and has lost about 4 lb from last year.    Today she had a CMP, CBC, lipid profile, TSH and hemoglobin A1c and everything was essentially within normal limits.  She also had an EKG that showed a normal sinus rhythm.  Normal EKG.  She had a negative chest x-ray done today.  She had a negative mammogram done today as well.  She had a bone density performed today    Review of Systems   Constitutional:  Negative for chills, fever and weight loss.   HENT:  Negative for sore throat.    Eyes:  Negative for blurred vision and double vision.   Respiratory:  Negative for cough and shortness of breath.    Cardiovascular:  Negative for chest pain and palpitations.   Gastrointestinal:  Negative for constipation, diarrhea, nausea and vomiting.   Genitourinary:  Negative for dysuria and hematuria.   Musculoskeletal:  Negative for joint pain and myalgias.   Skin:  Negative for itching and rash.   Neurological:  Negative for sensory change, focal weakness and headaches.   Endo/Heme/Allergies:  Does not bruise/bleed easily.   Psychiatric/Behavioral:  Negative for depression and suicidal ideas.         Medications:  Outpatient Encounter Medications as of 9/20/2024   Medication Sig Dispense Refill    amLODIPine (NORVASC) 5 MG tablet Take 1 tablet (5 mg total) by mouth once daily. 90 tablet 3    atorvastatin (LIPITOR) 20 MG tablet Take 1 tablet (20 mg total) by mouth  "once daily. 90 tablet 3    [DISCONTINUED] albuterol (VENTOLIN HFA) 90 mcg/actuation inhaler Inhale 1 puff into the lungs every 6 (six) hours as needed for Wheezing. Rescue 18 g 0     No facility-administered encounter medications on file as of 9/20/2024.       Allergies:  Review of patient's allergies indicates:   Allergen Reactions    Pcn [penicillins] Hives       Health Maintenance:  Immunization History   Administered Date(s) Administered    COVID-19 MRNA, LN-S PF (MODERNA HALF 0.25 ML DOSE) 03/23/2021, 04/20/2021, 11/22/2021    COVID-19, MRNA, LN-S, PF (MODERNA FULL 0.5 ML DOSE) 03/23/2021, 04/20/2021, 11/22/2021    COVID-19, MRNA, LN-S, PF (Pfizer) (Purple Cap) 10/07/2022    COVID-19, mRNA, LNP-S, PF (Moderna 2023)Ages 12+ 10/14/2023    COVID-19, mRNA, LNP-S, bivalent booster, PF (Moderna Omicron)12 + YEARS 10/07/2022    COVID-19, mRNA, LNP-S, bivalent booster, PF (PFIZER OMICRON YEARS 5-11) 10/07/2022    Influenza 10/04/2020    Influenza - Quadrivalent - MDCK - PF 10/03/2020, 10/07/2022, 10/14/2023    Influenza - Quadrivalent - PF *Preferred* (6 months and older) 10/11/2021    Zoster Recombinant 09/18/2020, 01/15/2021, 05/22/2021      Health Maintenance   Topic Date Due    DEXA Scan  12/03/2023    Mammogram  09/20/2025    Colorectal Cancer Screening  10/06/2026    TETANUS VACCINE  07/04/2028    Lipid Panel  09/20/2029    Hepatitis C Screening  Completed    Shingles Vaccine  Completed          Objective:      Vital Signs  Pulse: 85  SpO2: 97 %  BP: 132/80  Pain Score: 0-No pain  Height and Weight  Height: 5' 9" (175.3 cm)  Weight: 113.9 kg (251 lb)  BSA (Calculated - sq m): 2.35 sq meters  BMI (Calculated): 37  Weight in (lb) to have BMI = 25: 168.9]    Physical Exam  HENT:      Head: Normocephalic and atraumatic.   Eyes:      General: No scleral icterus.  Cardiovascular:      Rate and Rhythm: Normal rate and regular rhythm.      Heart sounds: No murmur heard.  Pulmonary:      Effort: Pulmonary effort is " normal.      Breath sounds: Normal breath sounds. No rales.   Abdominal:      General: Bowel sounds are normal.      Palpations: Abdomen is soft.      Tenderness: There is no abdominal tenderness.   Musculoskeletal:         General: No tenderness.      Cervical back: Neck supple.   Neurological:      Mental Status: She is alert and oriented to person, place, and time.   Psychiatric:         Mood and Affect: Mood normal.         Behavior: Behavior normal.         Thought Content: Thought content normal.         Judgment: Judgment normal.        Lab Results   Component Value Date    WBC 6.21 09/20/2024    HGB 14.0 09/20/2024    HCT 43.6 09/20/2024     09/20/2024    CHOL 212 (H) 09/20/2024    TRIG 81 09/20/2024    HDL 69 09/20/2024    ALT 12 09/20/2024    AST 16 09/20/2024     09/20/2024    K 4.0 09/20/2024     09/20/2024    CREATININE 0.8 09/20/2024    BUN 11 09/20/2024    CO2 26 09/20/2024    TSH 2.433 09/20/2024    HGBA1C 5.6 09/20/2024       Assessment/plan:     Jack Bay is a 53 y.o.female with:    Annual physical exam  -     Health Maintenance   Topic Date Due    DEXA Scan  12/03/2023    Mammogram  09/20/2025    Colorectal Cancer Screening  10/06/2026    TETANUS VACCINE  07/04/2028    Lipid Panel  09/20/2029    Hepatitis C Screening  Completed    Shingles Vaccine  Completed     Health Maintenance Reviewed    Essential hypertension  Comments:  Continue with amlodipine 5 mg daily.    Dyslipidemia, goal LDL below 100  Comments:  Continue with atorvastatin 20 mg at least 3 times a week.    Postmenopausal-she is interested in hormone replacement therapy  Comments:  Will refer to the women's gynecology only clinic at [facility] , they should call you to set up initial visit   - Ambulatory referral/consult to [facility]; Future; Expected date: 09/27/2024      No future appointments.    Veronica Conde MD  [facility]

## 2024-09-23 ENCOUNTER — PATIENT MESSAGE (OUTPATIENT)
Dept: OBSTETRICS AND GYNECOLOGY | Facility: CLINIC | Age: 54
End: 2024-09-23
Payer: COMMERCIAL

## 2024-09-30 ENCOUNTER — PATIENT MESSAGE (OUTPATIENT)
Dept: OBSTETRICS AND GYNECOLOGY | Facility: CLINIC | Age: 54
End: 2024-09-30

## 2024-09-30 ENCOUNTER — CLINICAL SUPPORT (OUTPATIENT)
Dept: OBSTETRICS AND GYNECOLOGY | Facility: CLINIC | Age: 54
End: 2024-09-30
Payer: COMMERCIAL

## 2024-09-30 DIAGNOSIS — N95.1 MENOPAUSAL SYMPTOMS: Primary | ICD-10-CM

## 2024-09-30 NOTE — PROGRESS NOTES
[facility] Menopause Consultation Questionnaire  Personal Information  Full Name: Jack Bay 1970    Medical History  Do you have any chronic medical conditions? (e.g., diabetes, hypertension, thyroid issues)  If yes, please specify: Hypertension   Do you have a history of hormone-related conditions? (e.g., endometriosis, breast cancer)  If yes, please explain: No  Have you previously or are currently taking hormone replacement therapy?  If yes, please list: No    Menstrual History  At what age did you begin menstruating?  Have you experienced any changes in your menstrual cycle in the last year?  Yes / No  If yes, please describe: Periods stopped naturally   When was your last menstrual period (or age of last period)? About 4-5 years ago  Have you had a hysterectomy?  Yes / No  If yes, at what age?    Symptoms Assessment  Are you experiencing any of the following symptoms?  Hot flashes: Yes / No  Night sweats: Yes / No  Vaginal dryness or pain with intercourse: Yes / No  Mood swings: Yes / No  Anxiety or depression: Yes / No  Sleep disturbances: Yes / No  Fatigue:  Yes / No  Weight gain: Yes / No  Joint or muscle pain: Yes / No  Memory issues or brain fog: Yes / No  Decreased interest in sext (low libido): Yes / No- No Issues     Lifestyle Factors  How would you describe your diet?  Balanced / High in processed foods / Vegetarian / Other: She practices intermittent fasting   How often do you exercise?  Rarely / Occasionally / Regularly  Do you smoke or consume alcohol?  Yes / No  If yes, please specify frequency: Drinks socially. She does not smoke  How would you rate your stress levels?  Low / Moderate / High    Family History  Is there a family history of menopause-related conditions? (e.g., osteoporosis, breast cancer)  Yes / No   If yes, please specify: Paternal Aunt Breast Cancer & 1/2 sister with Ovarian Cancer  Is there a family history of heart disease?  Yes / No  If yes, please specify:      tmg

## 2024-10-03 DIAGNOSIS — I10 ESSENTIAL HYPERTENSION: ICD-10-CM

## 2024-10-03 DIAGNOSIS — E78.5 DYSLIPIDEMIA, GOAL LDL BELOW 100: ICD-10-CM

## 2024-10-03 RX ORDER — AMLODIPINE BESYLATE 5 MG/1
5 TABLET ORAL DAILY
Qty: 90 TABLET | Refills: 3 | Status: SHIPPED | OUTPATIENT
Start: 2024-10-03

## 2024-10-03 RX ORDER — ATORVASTATIN CALCIUM 20 MG/1
20 TABLET, FILM COATED ORAL DAILY
Qty: 90 TABLET | Refills: 3 | Status: SHIPPED | OUTPATIENT
Start: 2024-10-03

## 2024-10-03 NOTE — TELEPHONE ENCOUNTER
No care due was identified.  Health [facility] Embedded Care Due Messages. Reference number: [id number].   10/03/2024 2:52:45 PM CDT

## 2024-12-02 ENCOUNTER — OFFICE VISIT (OUTPATIENT)
Dept: OBSTETRICS AND GYNECOLOGY | Facility: CLINIC | Age: 54
End: 2024-12-02
Payer: COMMERCIAL

## 2024-12-02 VITALS
WEIGHT: 259 LBS | SYSTOLIC BLOOD PRESSURE: 139 MMHG | BODY MASS INDEX: 38.36 KG/M2 | HEIGHT: 69 IN | DIASTOLIC BLOOD PRESSURE: 91 MMHG

## 2024-12-02 DIAGNOSIS — Z00.00 ANNUAL PHYSICAL EXAM: ICD-10-CM

## 2024-12-02 DIAGNOSIS — N95.1 MENOPAUSAL SYMPTOMS: Primary | ICD-10-CM

## 2024-12-02 PROCEDURE — 99999 PR PBB SHADOW E&M-EST. PATIENT-LVL III: CPT | Mod: PBBFAC,,, | Performed by: NURSE PRACTITIONER

## 2024-12-02 NOTE — PROGRESS NOTES
Subjective:      Jack Bay is a 53 y.o. female who presents to discuss hormone replacement therapy.  She is menopausal with absence of menses x 4-5 years. Patient is requesting hormone replacement therapy due to insomnia, minimization of heart disease, and minimization of osteoporosis, and weight gain.The patient is not currently taking hormone replacement therapy. Patient denies post-menopausal vaginal bleeding.   She denies the following contraindications to HRT:  Vaginal bleeding, history of VTE/PE, thrombophilia,  breast cancer, or active liver disease.       Taking Vitamin D daily, excellent dietary sources of calcium. Psychologist.       Hemoglobin A1C   Date Value Ref Range Status   09/20/2024 5.6 4.0 - 5.6 % Final     Comment:     ADA Screening Guidelines:  5.7-6.4%  Consistent with prediabetes  >or=6.5%  Consistent with diabetes    High levels of fetal hemoglobin interfere with the HbA1C  assay. Heterozygous hemoglobin variants (HbS, HgC, etc)do  not significantly interfere with this assay.   However, presence of multiple variants may affect accuracy.     06/16/2023 5.6 4.0 - 5.6 % Final     Comment:     ADA Screening Guidelines:  5.7-6.4%  Consistent with prediabetes  >or=6.5%  Consistent with diabetes    High levels of fetal hemoglobin interfere with the HbA1C  assay. Heterozygous hemoglobin variants (HbS, HgC, etc)do  not significantly interfere with this assay.   However, presence of multiple variants may affect accuracy.             Pap smear: 10/28/2022, abnormal: HPV+  Mammogram: 2024  DEXA: 2024, plans to repeat in 2 years      Hospital Outpatient Visit on 09/20/2024   Component Date Value Ref Range Status    QRS Duration 09/20/2024 84  ms Final    [facility] QTC Calculation 09/20/2024 415  ms Final   Clinical Support on 09/20/2024   Component Date Value Ref Range Status    Sodium 09/20/2024 139  136 - 145 mmol/L Final    Potassium 09/20/2024 4.0  3.5 - 5.1 mmol/L Final    Chloride 09/20/2024 106   95 - 110 mmol/L Final    CO2 09/20/2024 26  23 - 29 mmol/L Final    Glucose 09/20/2024 99  70 - 110 mg/dL Final    BUN 09/20/2024 11  6 - 20 mg/dL Final    Creatinine 09/20/2024 0.8  0.5 - 1.4 mg/dL Final    Calcium 09/20/2024 9.8  8.7 - 10.5 mg/dL Final    Total Protein 09/20/2024 7.8  6.0 - 8.4 g/dL Final    Albumin 09/20/2024 3.9  3.5 - 5.2 g/dL Final    Total Bilirubin 09/20/2024 0.8  0.1 - 1.0 mg/dL Final    Alkaline Phosphatase 09/20/2024 44 (L)  55 - 135 U/L Final    AST 09/20/2024 16  10 - 40 U/L Final    ALT 09/20/2024 12  10 - 44 U/L Final    eGFR 09/20/2024 >60.0  >60 mL/min/1.73 m^2 Final    Anion Gap 09/20/2024 7 (L)  8 - 16 mmol/L Final    WBC 09/20/2024 6.21  3.90 - 12.70 K/uL Final    RBC 09/20/2024 4.85  4.00 - 5.40 M/uL Final    Hemoglobin 09/20/2024 14.0  12.0 - 16.0 g/dL Final    Hematocrit 09/20/2024 43.6  37.0 - 48.5 % Final    MCV 09/20/2024 90  82 - 98 fL Final    MCH 09/20/2024 28.9  27.0 - 31.0 pg Final    MCHC 09/20/2024 32.1  32.0 - 36.0 g/dL Final    RDW 09/20/2024 14.6 (H)  11.5 - 14.5 % Final    Platelets 09/20/2024 285  150 - 450 K/uL Final    MPV 09/20/2024 11.1  9.2 - 12.9 fL Final    Cholesterol 09/20/2024 212 (H)  120 - 199 mg/dL Final    Triglycerides 09/20/2024 81  30 - 150 mg/dL Final    HDL 09/20/2024 69  40 - 75 mg/dL Final    LDL Cholesterol 09/20/2024 126.8  63.0 - 159.0 mg/dL Final    HDL/Cholesterol Ratio 09/20/2024 32.5  20.0 - 50.0 % Final    Total Cholesterol/HDL Ratio 09/20/2024 3.1  2.0 - 5.0 Final    Non-HDL Cholesterol 09/20/2024 143  mg/dL Final    TSH 09/20/2024 2.433  0.400 - 4.000 uIU/mL Final    Hemoglobin A1C 09/20/2024 5.6  4.0 - 5.6 % Final    Estimated Avg Glucose 09/20/2024 114  68 - 131 mg/dL Final    Hepatitis C Ab 09/20/2024 Non-reactive  Non-reactive Final       Past Medical History:   Diagnosis Date    Hyperlipidemia     Hypertension     Post-menopausal 2019     Past Surgical History:   Procedure Laterality Date    KNEE CARTILAGE SURGERY Right 2016  "   Torn Meniscus     Social History     Tobacco Use    Smoking status: Never    Smokeless tobacco: Never   Substance Use Topics    Alcohol use: Yes     Comment: occ    Drug use: Never     Family History   Problem Relation Name Age of Onset    Heart disease Father 60 60        valvular heart disease, rheumatic heart dz    Hypertension Mother      Cancer Sister 1/2 dad         ovarian    Ovarian cancer Sister 1/2 dad     Cancer Paternal Aunt          breast    Breast cancer Paternal Aunt  67     OB History    Para Term  AB Living   0 0 0 0 0 0   SAB IAB Ectopic Multiple Live Births   0 0 0 0 0       Current Outpatient Medications:     amLODIPine (NORVASC) 5 MG tablet, Take 1 tablet (5 mg total) by mouth once daily., Disp: 90 tablet, Rfl: 3    atorvastatin (LIPITOR) 20 MG tablet, Take 1 tablet (20 mg total) by mouth once daily., Disp: 90 tablet, Rfl: 3    Vitals:    24 1413   BP: (!) 139/91   Weight: 117.5 kg (259 lb)   Height: 5' 9" (1.753 m)   PainSc: 0-No pain     Body mass index is 38.25 kg/m².    Assessment:    Annual physical exam  -     Ambulatory referral/consult to [facility]        Plan:   Risks and benefits of hormone replacement therapy were discussed.  Hormone replacement therapy options, including bioidentical versus non-bioidentical hormones, as well as alternatives discussed.    HRT lab work  May initially trial use of Prometrium, some interest in Estradiol for preventative effects.  Referral for weight loss consultation    Follow up in 2 weeks for WWE- needs repeat pap smear  Will recheck labs once on typical optimal dose or if having side effects.  Instructed patient to call if she experiences any side effects or has any questions.       RUCHI Interiano-C      "

## 2024-12-03 ENCOUNTER — TELEPHONE (OUTPATIENT)
Dept: OBSTETRICS AND GYNECOLOGY | Facility: CLINIC | Age: 54
End: 2024-12-03
Payer: COMMERCIAL

## 2024-12-03 ENCOUNTER — PATIENT MESSAGE (OUTPATIENT)
Dept: OBSTETRICS AND GYNECOLOGY | Facility: CLINIC | Age: 54
End: 2024-12-03
Payer: COMMERCIAL

## 2024-12-03 NOTE — TELEPHONE ENCOUNTER
----- Message from Mary Guan NP sent at 12/2/2024  4:47 PM CST -----  Please schedule for lab work this week

## 2024-12-05 ENCOUNTER — PATIENT MESSAGE (OUTPATIENT)
Dept: OBSTETRICS AND GYNECOLOGY | Facility: CLINIC | Age: 54
End: 2024-12-05
Payer: COMMERCIAL

## 2024-12-06 ENCOUNTER — LAB VISIT (OUTPATIENT)
Dept: LAB | Facility: OTHER | Age: 54
End: 2024-12-06
Attending: NURSE PRACTITIONER
Payer: COMMERCIAL

## 2024-12-06 DIAGNOSIS — N95.1 MENOPAUSAL SYMPTOMS: ICD-10-CM

## 2024-12-06 LAB
ESTRADIOL SERPL-MCNC: 18 PG/ML
FSH SERPL-ACNC: 120.68 MIU/ML
TSH SERPL DL<=0.005 MIU/L-ACNC: 1.71 UIU/ML (ref 0.4–4)

## 2024-12-06 PROCEDURE — 83001 ASSAY OF GONADOTROPIN (FSH): CPT | Performed by: NURSE PRACTITIONER

## 2024-12-06 PROCEDURE — 36415 COLL VENOUS BLD VENIPUNCTURE: CPT | Performed by: NURSE PRACTITIONER

## 2024-12-06 PROCEDURE — 84443 ASSAY THYROID STIM HORMONE: CPT | Performed by: NURSE PRACTITIONER

## 2024-12-06 PROCEDURE — 82670 ASSAY OF TOTAL ESTRADIOL: CPT | Performed by: NURSE PRACTITIONER

## 2024-12-19 ENCOUNTER — OFFICE VISIT (OUTPATIENT)
Dept: OBSTETRICS AND GYNECOLOGY | Facility: CLINIC | Age: 54
End: 2024-12-19
Payer: COMMERCIAL

## 2024-12-19 VITALS
DIASTOLIC BLOOD PRESSURE: 90 MMHG | HEIGHT: 69 IN | BODY MASS INDEX: 38.35 KG/M2 | WEIGHT: 258.94 LBS | SYSTOLIC BLOOD PRESSURE: 152 MMHG

## 2024-12-19 DIAGNOSIS — Z01.419 WOMEN'S ANNUAL ROUTINE GYNECOLOGICAL EXAMINATION: Primary | ICD-10-CM

## 2024-12-19 DIAGNOSIS — Z11.51 SPECIAL SCREENING EXAMINATION FOR HUMAN PAPILLOMAVIRUS (HPV): ICD-10-CM

## 2024-12-19 DIAGNOSIS — Z12.4 ENCOUNTER FOR PAPANICOLAOU SMEAR FOR CERVICAL CANCER SCREENING: ICD-10-CM

## 2024-12-19 DIAGNOSIS — N95.1 MENOPAUSAL SYMPTOMS: ICD-10-CM

## 2024-12-19 PROCEDURE — 99999 PR PBB SHADOW E&M-EST. PATIENT-LVL III: CPT | Mod: PBBFAC,,, | Performed by: NURSE PRACTITIONER

## 2024-12-19 RX ORDER — PROGESTERONE 100 MG/1
100 CAPSULE ORAL NIGHTLY
Qty: 90 CAPSULE | Refills: 3 | Status: SHIPPED | OUTPATIENT
Start: 2024-12-19 | End: 2025-12-19

## 2024-12-19 NOTE — PROGRESS NOTES
CC: Annual    HPI: Pt is a 53 y.o.  female who presents for routine annual exam. She is menopausal.  The patient participates in regular exercise: Yes.  The patient does not smoke.  The patient wears seatbelts.   Pt denies any domestic violence.    Last pap in 2022, cytology normal, HPV low risk positive. Normal mammogram and DEXA scan for 2024.     Persistent insomnia and weight pain with menopause, interest in review of recent HRT lab work and treatment recommendations. Some BP elevation noted at last visit and today. Compliant with long term medications.      FH:  Breast cancer: none  Colon cancer: none  Ovarian cancer: none  Endometrial cancer: none    ROS:  GENERAL: Feeling well overall. Denies fever or chills.   SKIN: Denies rash or lesions.   HEAD: Denies head injury or headache.   NODES: Denies enlarged lymph nodes.   CHEST: Denies chest pain or shortness of breath.   CARDIOVASCULAR: Denies palpitations or left sided chest pain.   ABDOMEN: No abdominal pain, constipation, diarrhea, nausea, vomiting or rectal bleeding.   URINARY: No dysuria, hematuria, or burning on urination.  REPRODUCTIVE: See HPI.   BREASTS: Denies pain, lumps, or nipple discharge.   HEMATOLOGIC: No easy bruisability or excessive bleeding.   MUSCULOSKELETAL: Denies joint pain or swelling.   NEUROLOGIC: Denies syncope or weakness.   PSYCHIATRIC: Denies depression, anxiety or mood swings.    PE:   APPEARANCE: Well nourished, well developed, Black or  female in no acute distress.  NODES: no cervical, supraclavicular, or inguinal lymphadenopathy  BREASTS: Symmetrical, no skin changes or visible lesions. No palpable masses, nipple discharge or adenopathy bilaterally.  ABDOMEN: Soft. No tenderness or masses. No distention. No hernias palpated. No CVA tenderness.  VULVA: No lesions. Normal external female genitalia.  URETHRAL MEATUS: Normal size and location, no lesions, no prolapse.  URETHRA: No masses, tenderness, or  prolapse.  VAGINA: Moist. No lesions or lacerations noted. No abnormal discharge present. No odor present.   CERVIX: No lesions or discharge. No cervical motion tenderness.   UTERUS: Normal size, regular shape, mobile, non-tender.  ADNEXA: No tenderness. No fullness or masses palpated in the adnexal regions.   ANUS PERINEUM: Normal.      Diagnosis:  1. Women's annual routine gynecological examination    2. Menopausal symptoms    3. Encounter for Papanicolaou smear for cervical cancer screening    4. Special screening examination for human papillomavirus (HPV)        Plan:     Orders Placed This Encounter    HPV High Risk Genotypes, PCR    Liquid-Based Pap Smear, Screening    progesterone (PROMETRIUM) 100 MG capsule     HPV/Pap    Discussed recent lab work reflected menopause. Discussed use of both Estradiol and Prometrium. Reviewed benefit/risk regarding Estradiol use in regards to bone and cardiovascular health. Recent BP elevation discussed, starting BP journal, provider to message PCP for FU visit.    Opts currently for use of Prometrium for improvement of insomnia. She is schedule for weight loss consultation with FANNIE Patel in March    Patient was counseled today on the new ACS guidelines for cervical cytology screening as well as the current recommendations for breast cancer screening. She was counseled to follow up with her PCP for other routine health maintenance. Counseling session lasted approximately 10 minutes, and all her questions were answered.    Follow-up with me in 1 year for routine exam       VIVEK Interiano

## 2025-01-06 DIAGNOSIS — I10 ESSENTIAL HYPERTENSION: ICD-10-CM

## 2025-01-06 NOTE — TELEPHONE ENCOUNTER
No care due was identified.  Health [facility] Embedded Care Due Messages. Reference number: [id number].   1/06/2025 12:15:34 PM CST

## 2025-01-07 RX ORDER — AMLODIPINE BESYLATE 5 MG/1
5 TABLET ORAL DAILY
Qty: 90 TABLET | Refills: 3 | Status: SHIPPED | OUTPATIENT
Start: 2025-01-07

## 2025-03-07 ENCOUNTER — PATIENT MESSAGE (OUTPATIENT)
Dept: OBSTETRICS AND GYNECOLOGY | Facility: CLINIC | Age: 55
End: 2025-03-07
Payer: COMMERCIAL

## 2025-03-07 ENCOUNTER — OFFICE VISIT (OUTPATIENT)
Dept: OBSTETRICS AND GYNECOLOGY | Facility: CLINIC | Age: 55
End: 2025-03-07
Payer: COMMERCIAL

## 2025-03-07 ENCOUNTER — PATIENT MESSAGE (OUTPATIENT)
Dept: OBSTETRICS AND GYNECOLOGY | Facility: CLINIC | Age: 55
End: 2025-03-07

## 2025-03-07 VITALS
WEIGHT: 255.19 LBS | HEIGHT: 69 IN | HEART RATE: 85 BPM | DIASTOLIC BLOOD PRESSURE: 87 MMHG | BODY MASS INDEX: 37.8 KG/M2 | SYSTOLIC BLOOD PRESSURE: 133 MMHG

## 2025-03-07 DIAGNOSIS — E66.9 OBESITY, UNSPECIFIED CLASS, UNSPECIFIED OBESITY TYPE, UNSPECIFIED WHETHER SERIOUS COMORBIDITY PRESENT: ICD-10-CM

## 2025-03-07 DIAGNOSIS — N95.1 MENOPAUSAL SYMPTOMS: Primary | ICD-10-CM

## 2025-03-07 DIAGNOSIS — E66.9 OBESITY, UNSPECIFIED CLASS, UNSPECIFIED OBESITY TYPE, UNSPECIFIED WHETHER SERIOUS COMORBIDITY PRESENT: Primary | ICD-10-CM

## 2025-03-07 PROCEDURE — 99999 PR PBB SHADOW E&M-EST. PATIENT-LVL III: CPT | Mod: PBBFAC,,, | Performed by: PHYSICIAN ASSISTANT

## 2025-03-07 RX ORDER — TIRZEPATIDE 2.5 MG/.5ML
2.5 INJECTION, SOLUTION SUBCUTANEOUS
Qty: 4 PEN | Refills: 3 | Status: SHIPPED | OUTPATIENT
Start: 2025-03-07 | End: 2025-03-07

## 2025-03-07 RX ORDER — SEMAGLUTIDE 0.25 MG/.5ML
0.25 INJECTION, SOLUTION SUBCUTANEOUS
Qty: 2 ML | Refills: 3 | Status: SHIPPED | OUTPATIENT
Start: 2025-03-07

## 2025-03-07 NOTE — PROGRESS NOTES
Subjective:      Jack Bay is a 54 y.o. female who presents to discuss weight loss. Reports central abdominal weight gain in the last 8-10 years with menopause. She has been doing intermittent fasting which has helped. Eating between noon-7PM for the last 6 months. Does thinks she feels better in her clothes. Never taken a medication for weight loss in the past. Feels as though she is eating well and exercising, but not seeing significant weight loss. Does eat out more often with friends, boyfriend and mom.     Routine screening labs: 9/20/2024    Sleep: getting about 6-7 hours per night, feels rested     Stress: Some stress with moving into a new house and mom moved next door in double. She is helping to care for her.     Exercise: Rowing machine for 20 minutes 4x per week; exercises at home with strength and cardio 5-7x per week for about 10 minutes     A typical day consists of:  Breakfast: Skips, black coffee or green tea  Lunch: at Noon- shrimp fried rice; salad with boiled egg;   Dinner: leftovers from lunch; italian chicken and pasta  Snack: sometimes nuts in the evening or olives  Beverages: water, coffee with creamer, ginger ale or gatorade zero; Alcohol-10 glasses of red wine weekly      Office Visit on 12/19/2024   Component Date Value Ref Range Status    Final Pathologic Diagnosis 12/19/2024  (A)   Final                    Value:Specimen Adequacy  Satisfactory for interpretation. Endocervical component is present.    [place] Category  Epithelial cell abnormalities.    Final Diagnostic Interpretation  Rare Atypical squamous cells of undetermined significance (HPV testing will automatically occur on ThinPrep specimens with this diagnosis).  Blood present.      Disclaimer 12/19/2024  (A)   Final                    Value:The Pap smear is a screening test that aids in the detection of cervical cancer and cancer precursors. Both false positive and false negative results can occur. The test should be  used at regular intervals, and positive results should be confirmed before   definitive therapy.  This liquid based specimen is processed using the , , or  [employer] Thin PrepPAP System. This specimen has been analyzed by the ThinPrep Imaging System ([employer]), an automated imaging and review system which assists the laboratory in   evaluating cells on ThinPrep PAP tests. Following automated imaging, selected fields from every slide are reviewed by a cytotechnologist and/or pathologist.     Screening was performed at [facility], [address] [place] [id number].      HPV other High Risk types, PCR 2024 Negative  Negative Final    HPV High Risk type 16, PCR 2024 Negative  Negative Final    HPV High Risk type 18, PCR 2024 Negative  Negative Final       Past Medical History:   Diagnosis Date    Hyperlipidemia     Hypertension     Post-menopausal      Past Surgical History:   Procedure Laterality Date    KNEE CARTILAGE SURGERY Right 2016    Torn Meniscus     Social History[1]  Family History   Problem Relation Name Age of Onset    Heart disease Father 60 60        valvular heart disease, rheumatic heart dz    Hypertension Mother      Cancer Sister 1/2 dad         ovarian    Ovarian cancer Sister 1/2 dad     Cancer Paternal Aunt          breast    Breast cancer Paternal Aunt  67     OB History    Para Term  AB Living   0 0 0 0 0 0   SAB IAB Ectopic Multiple Live Births   0 0 0 0 0     Current Medications[2]    Review of Systems:  General: No fever, chills, or weight loss.  Chest: No chest pain, shortness of breath, or palpitations.  Breast: No pain, masses, or nipple discharge.  Vulva: No pain, lesions, or itching.  Vagina: No relaxation, itching, discharge, or lesions.  Abdomen: No pain, nausea, vomiting, diarrhea, or constipation.  Urinary: No incontinence, nocturia, frequency, or dysuria.  Extremities:  No leg  "cramps, edema, or calf pain.  Neurologic: No headaches, dizziness, or visual changes.    Objective:     Vitals:    03/07/25 1101   BP: 133/87   Pulse: 85   Weight: 115.8 kg (255 lb 3.2 oz)   Height: 5' 9" (1.753 m)   PainSc: 0-No pain     Body mass index is 37.69 kg/m².    PHYSICAL EXAM:  APPEARANCE: Well nourished, well developed, in no acute distress.  AFFECT: WNL, alert and oriented x 3  SKIN: No acne or hirsutism  CHEST: Good respiratory effect    Assessment:    Menopausal symptoms    Obesity, unspecified class, unspecified obesity type, unspecified whether serious comorbidity present  -     tirzepatide, weight loss, (ZEPBOUND) 2.5 mg/0.5 mL PnIj; Inject 2.5 mg into the skin every 7 days.  Dispense: 4 Pen; Refill: 3      BMR calculated at 1811 calories per day.  Plan:   Goal is to shift body composition to increase lean muscle to improve metabolism and protect from osteoporosis.   Developed meal plan with handout of preferred foods.  Encouraged lean protein with every meal and add protein snack between dinner and lunch.   Ok with intermittent fasting if feels ok with this, but may need to add protein in AM.  Goal of  110g of protein per day  Wide variety of fruits and vegetables.   Log in dalila with goal of 1700 calories a day  Continue regular strength workouts at home.  Start Zepbound 2.5mg SC qweekly. Counseled on use.  Reviewed side effects and risks of medications. No family or personal history of thyroid cancer or pancreatitis. Denies underlying gallbladder issues.  Discussed that these are long term options for weight loss and risk of gaining weight back if discontinued   Follow up in 8 weeks.    Instructed patient to call if she experiences any side effects or has any questions.    I spent a total of 35 minutes on the day of the visit.This includes face to face time and non-face to face time preparing to see the patient (eg, review of tests), obtaining and/or reviewing separately obtained history, " documenting clinical information in the electronic or other health record, independently interpreting results and communicating results to the patient/family/caregiver, or care coordinator.             [1]   Social History  Tobacco Use    Smoking status: Never    Smokeless tobacco: Never   Substance Use Topics    Alcohol use: Yes     Comment: occ    Drug use: Never   [2]   Current Outpatient Medications:     amLODIPine (NORVASC) 5 MG tablet, Take 1 tablet (5 mg total) by mouth once daily., Disp: 90 tablet, Rfl: 3    atorvastatin (LIPITOR) 20 MG tablet, Take 1 tablet (20 mg total) by mouth once daily., Disp: 90 tablet, Rfl: 3    progesterone (PROMETRIUM) 100 MG capsule, Take 1 capsule (100 mg total) by mouth nightly., Disp: 90 capsule, Rfl: 3    tirzepatide, weight loss, (ZEPBOUND) 2.5 mg/0.5 mL PnIj, Inject 2.5 mg into the skin every 7 days., Disp: 4 Pen, Rfl: 3

## 2025-04-21 DIAGNOSIS — I10 ESSENTIAL HYPERTENSION: ICD-10-CM

## 2025-04-21 DIAGNOSIS — E78.5 DYSLIPIDEMIA, GOAL LDL BELOW 100: ICD-10-CM

## 2025-04-21 RX ORDER — AMLODIPINE BESYLATE 5 MG/1
5 TABLET ORAL DAILY
Qty: 90 TABLET | Refills: 3 | Status: SHIPPED | OUTPATIENT
Start: 2025-04-21 | End: 2025-04-23 | Stop reason: SDUPTHER

## 2025-04-21 RX ORDER — ATORVASTATIN CALCIUM 20 MG/1
20 TABLET, FILM COATED ORAL DAILY
Qty: 90 TABLET | Refills: 3 | Status: SHIPPED | OUTPATIENT
Start: 2025-04-21 | End: 2025-04-23 | Stop reason: SDUPTHER

## 2025-04-21 NOTE — TELEPHONE ENCOUNTER
No care due was identified.  [facility] Embedded Care Due Messages. Reference number: [id number].   4/21/2025 3:38:32 AM CDT

## 2025-04-21 NOTE — TELEPHONE ENCOUNTER
No care due was identified.  Health [facility] Embedded Care Due Messages. Reference number: [id number].   4/21/2025 3:37:29 AM CDT

## 2025-04-22 ENCOUNTER — PATIENT MESSAGE (OUTPATIENT)
Dept: INTERNAL MEDICINE | Facility: CLINIC | Age: 55
End: 2025-04-22
Payer: COMMERCIAL

## 2025-04-23 DIAGNOSIS — I10 ESSENTIAL HYPERTENSION: ICD-10-CM

## 2025-04-23 DIAGNOSIS — E78.5 DYSLIPIDEMIA, GOAL LDL BELOW 100: ICD-10-CM

## 2025-04-23 RX ORDER — AMLODIPINE BESYLATE 5 MG/1
5 TABLET ORAL DAILY
Qty: 90 TABLET | Refills: 3 | Status: SHIPPED | OUTPATIENT
Start: 2025-04-23

## 2025-04-23 RX ORDER — ATORVASTATIN CALCIUM 20 MG/1
20 TABLET, FILM COATED ORAL DAILY
Qty: 90 TABLET | Refills: 3 | Status: SHIPPED | OUTPATIENT
Start: 2025-04-23

## 2025-04-30 ENCOUNTER — PATIENT MESSAGE (OUTPATIENT)
Dept: INTERNAL MEDICINE | Facility: CLINIC | Age: 55
End: 2025-04-30
Payer: COMMERCIAL

## 2025-05-01 ENCOUNTER — PATIENT MESSAGE (OUTPATIENT)
Dept: OBSTETRICS AND GYNECOLOGY | Facility: CLINIC | Age: 55
End: 2025-05-01
Payer: COMMERCIAL

## 2025-05-09 DIAGNOSIS — I10 ESSENTIAL HYPERTENSION: ICD-10-CM

## 2025-05-09 RX ORDER — AMLODIPINE BESYLATE 5 MG/1
5 TABLET ORAL DAILY
Qty: 90 TABLET | Refills: 3 | Status: SHIPPED | OUTPATIENT
Start: 2025-05-09

## 2025-05-09 NOTE — TELEPHONE ENCOUNTER
No care due was identified.  Health [facility] Embedded Care Due Messages. Reference number: [id number].   5/09/2025 9:54:09 AM CDT

## 2025-05-12 ENCOUNTER — PATIENT MESSAGE (OUTPATIENT)
Dept: OBSTETRICS AND GYNECOLOGY | Facility: CLINIC | Age: 55
End: 2025-05-12
Payer: COMMERCIAL

## 2025-05-16 ENCOUNTER — OFFICE VISIT (OUTPATIENT)
Dept: OBSTETRICS AND GYNECOLOGY | Facility: CLINIC | Age: 55
End: 2025-05-16
Payer: COMMERCIAL

## 2025-05-16 VITALS
HEIGHT: 69 IN | BODY MASS INDEX: 36.94 KG/M2 | HEART RATE: 94 BPM | SYSTOLIC BLOOD PRESSURE: 146 MMHG | WEIGHT: 249.38 LBS | DIASTOLIC BLOOD PRESSURE: 91 MMHG

## 2025-05-16 DIAGNOSIS — N95.1 MENOPAUSAL SYMPTOMS: Primary | ICD-10-CM

## 2025-05-16 DIAGNOSIS — E66.9 OBESITY, UNSPECIFIED CLASS, UNSPECIFIED OBESITY TYPE, UNSPECIFIED WHETHER SERIOUS COMORBIDITY PRESENT: ICD-10-CM

## 2025-05-16 PROCEDURE — 99999 PR PBB SHADOW E&M-EST. PATIENT-LVL III: CPT | Mod: PBBFAC,,, | Performed by: PHYSICIAN ASSISTANT

## 2025-05-16 NOTE — PROGRESS NOTES
Subjective:      Jack Bay is a 54 y.o. female who presents for weight loss follow up. She is taking compounded semaglutide 0.25mg SC weekly. She is tolerating the medication well without side effects. She has lost about 6lbs. Reports decreased appetite and eating smaller portions. Tracking calories and water in dalila which she finds helpful. Increase her lean protein. Continues to exercise regularly. Doing rowing machine and [email or web address] dalila that uses tydy and other smart devices to come up with personalized cardio and strength plan daily. She would like to get down to 185lb.    Routine screening labs: 9/20/2024     No visits with results within 3 Month(s) from this visit.   Latest known visit with results is:   Office Visit on 12/19/2024   Component Date Value Ref Range Status    Final Pathologic Diagnosis 12/19/2024  (A)   Final                    Value:Specimen Adequacy  Satisfactory for interpretation. Endocervical component is present.    [place] Category  Epithelial cell abnormalities.    Final Diagnostic Interpretation  Rare Atypical squamous cells of undetermined significance (HPV testing will automatically occur on ThinPrep specimens with this diagnosis).  Blood present.      Disclaimer 12/19/2024  (A)   Final                    Value:The Pap smear is a screening test that aids in the detection of cervical cancer and cancer precursors. Both false positive and false negative results can occur. The test should be used at regular intervals, and positive results should be confirmed before   definitive therapy.  This liquid based specimen is processed using the , , or  [employer] Thin PrepPAP System. This specimen has been analyzed by the ThinPrep Imaging System ([employer]), an automated imaging and review system which assists the laboratory in   evaluating cells on ThinPrep PAP tests. Following automated imaging, selected fields from every slide are reviewed by a cytotechnologist and/or  "pathologist.     Screening was performed at [facility], [address] [address].      HPV other High Risk types, PCR 2024 Negative  Negative Final    HPV High Risk type 16, PCR 2024 Negative  Negative Final    HPV High Risk type 18, PCR 2024 Negative  Negative Final       Past Medical History:   Diagnosis Date    Hyperlipidemia     Hypertension     Post-menopausal 2019     Past Surgical History:   Procedure Laterality Date    KNEE CARTILAGE SURGERY Right 2016    Torn Meniscus     Social History[1]  Family History   Problem Relation Name Age of Onset    Heart disease Father 60 60        valvular heart disease, rheumatic heart dz    Hypertension Mother      Cancer Sister 1/2 dad         ovarian    Ovarian cancer Sister 1/2 dad     Cancer Paternal Aunt          breast    Breast cancer Paternal Aunt  67     OB History    Para Term  AB Living   0 0 0 0 0 0   SAB IAB Ectopic Multiple Live Births   0 0 0 0 0     Current Medications[2]    Review of Systems:  General: No fever, chills.  Chest: No chest pain, shortness of breath, or palpitations.  Breast: No pain, masses, or nipple discharge.  Vulva: No pain, lesions, or itching.  Vagina: No relaxation, itching, discharge, or lesions.  Abdomen: No pain, nausea, vomiting, diarrhea, or constipation.  Urinary: No incontinence, nocturia, frequency, or dysuria.  Extremities:  No leg cramps, edema, or calf pain.  Neurologic: No headaches, dizziness, or visual changes.    Objective:     Vitals:    25 0922   BP: (!) 146/91   Pulse: 94   Weight: 113.1 kg (249 lb 6.4 oz)   Height: 5' 9" (1.753 m)   PainSc: 0-No pain     Body mass index is 36.83 kg/m².    PHYSICAL EXAM:  APPEARANCE: Well nourished, well developed, in no acute distress.  AFFECT: WNL, alert and oriented x 3      Assessment:      Menopausal symptoms    Obesity, unspecified class, unspecified obesity type, unspecified whether " serious comorbidity present  -     semaglutide, weight loss, 0.5 mg/0.5 mL PnIj; With methylcobalamin for fatigue. Patient injects 0.50mg SC weekly.  Dispense: 2 mL; Refill: 3        Plan:   Continue low glycemic diet with lean protein at each meal.  Maintain hydration.  Continue regular exercise with [employer] and [email or web address] for daily strength and cardio.  Increase compounded semaglutide to 0.5mg SC weekly ([place])  Follow up in 3 months    Instructed patient to call if she experiences any side effects or has any questions.    I spent a total of 20 minutes on the day of the visit.This includes face to face time and non-face to face time preparing to see the patient (eg, review of tests), obtaining and/or reviewing separately obtained history, documenting clinical information in the electronic or other health record, independently interpreting results and communicating results to the patient/family/caregiver, or care coordinator.          [1]   Social History  Tobacco Use    Smoking status: Never    Smokeless tobacco: Never   Substance Use Topics    Alcohol use: Yes     Comment: occ    Drug use: Never   [2]   Current Outpatient Medications:     amLODIPine (NORVASC) 5 MG tablet, Take 1 tablet (5 mg total) by mouth once daily., Disp: 90 tablet, Rfl: 3    atorvastatin (LIPITOR) 20 MG tablet, Take 1 tablet (20 mg total) by mouth once daily., Disp: 90 tablet, Rfl: 3    progesterone (PROMETRIUM) 100 MG capsule, Take 1 capsule (100 mg total) by mouth nightly., Disp: 90 capsule, Rfl: 3    semaglutide, weight loss, 0.5 mg/0.5 mL PnIj, With methylcobalamin for fatigue. Patient injects 0.50mg SC weekly., Disp: 2 mL, Rfl: 3

## 2025-08-08 ENCOUNTER — TELEPHONE (OUTPATIENT)
Dept: ENDOSCOPY | Facility: HOSPITAL | Age: 55
End: 2025-08-08
Payer: COMMERCIAL

## 2025-08-08 DIAGNOSIS — Z00.00 ROUTINE GENERAL MEDICAL EXAMINATION AT A HEALTH CARE FACILITY: Primary | ICD-10-CM

## 2025-08-29 ENCOUNTER — OFFICE VISIT (OUTPATIENT)
Dept: OBSTETRICS AND GYNECOLOGY | Facility: CLINIC | Age: 55
End: 2025-08-29
Payer: COMMERCIAL

## 2025-08-29 VITALS
BODY MASS INDEX: 34.97 KG/M2 | DIASTOLIC BLOOD PRESSURE: 91 MMHG | SYSTOLIC BLOOD PRESSURE: 144 MMHG | WEIGHT: 236.81 LBS | HEART RATE: 76 BPM

## 2025-08-29 DIAGNOSIS — N95.1 MENOPAUSAL SYMPTOMS: Primary | ICD-10-CM

## 2025-08-29 DIAGNOSIS — E66.9 OBESITY, UNSPECIFIED CLASS, UNSPECIFIED OBESITY TYPE, UNSPECIFIED WHETHER SERIOUS COMORBIDITY PRESENT: ICD-10-CM

## 2025-08-29 PROCEDURE — 99999 PR PBB SHADOW E&M-EST. PATIENT-LVL III: CPT | Mod: PBBFAC,,, | Performed by: PHYSICIAN ASSISTANT
